# Patient Record
Sex: MALE | Race: WHITE | URBAN - METROPOLITAN AREA
[De-identification: names, ages, dates, MRNs, and addresses within clinical notes are randomized per-mention and may not be internally consistent; named-entity substitution may affect disease eponyms.]

---

## 2020-06-23 ENCOUNTER — HISTORICAL (OUTPATIENT)
Dept: ADMINISTRATIVE | Facility: HOSPITAL | Age: 60
End: 2020-06-23

## 2020-06-23 LAB
INR PPP: 1 (ref 0–1.3)
PROTHROMBIN TIME: 12.7 SECOND(S) (ref 11.1–13.7)

## 2023-12-28 DIAGNOSIS — Z12.11 COLON CANCER SCREENING: Primary | ICD-10-CM

## 2024-06-10 ENCOUNTER — HOSPITAL ENCOUNTER (EMERGENCY)
Facility: HOSPITAL | Age: 64
Discharge: HOME | End: 2024-06-10
Attending: EMERGENCY MEDICINE
Payer: MEDICARE

## 2024-06-10 VITALS
HEIGHT: 71 IN | TEMPERATURE: 97 F | SYSTOLIC BLOOD PRESSURE: 134 MMHG | BODY MASS INDEX: 29.54 KG/M2 | DIASTOLIC BLOOD PRESSURE: 80 MMHG | HEART RATE: 77 BPM | RESPIRATION RATE: 10 BRPM | OXYGEN SATURATION: 95 % | WEIGHT: 211 LBS

## 2024-06-10 DIAGNOSIS — S32.010A COMPRESSION FRACTURE OF L1 VERTEBRA, INITIAL ENCOUNTER: Primary | ICD-10-CM

## 2024-06-10 LAB
ALBUMIN SERPL-MCNC: 3.6 G/DL (ref 3.4–4.8)
ALBUMIN/GLOB SERPL: 0.8 RATIO (ref 1.1–2)
ALP SERPL-CCNC: 88 UNIT/L (ref 40–150)
ALT SERPL-CCNC: 28 UNIT/L (ref 0–55)
ANION GAP SERPL CALC-SCNC: 9 MEQ/L
AST SERPL-CCNC: 32 UNIT/L (ref 5–34)
BASOPHILS # BLD AUTO: 0.05 X10(3)/MCL
BASOPHILS NFR BLD AUTO: 0.9 %
BILIRUB SERPL-MCNC: 0.2 MG/DL
BUN SERPL-MCNC: 13.1 MG/DL (ref 8.4–25.7)
CALCIUM SERPL-MCNC: 9.4 MG/DL (ref 8.8–10)
CHLORIDE SERPL-SCNC: 102 MMOL/L (ref 98–107)
CO2 SERPL-SCNC: 26 MMOL/L (ref 23–31)
CREAT SERPL-MCNC: 0.87 MG/DL (ref 0.73–1.18)
CREAT/UREA NIT SERPL: 15
EOSINOPHIL # BLD AUTO: 0.22 X10(3)/MCL (ref 0–0.9)
EOSINOPHIL NFR BLD AUTO: 3.8 %
ERYTHROCYTE [DISTWIDTH] IN BLOOD BY AUTOMATED COUNT: 15.1 % (ref 11.5–17)
GFR SERPLBLD CREATININE-BSD FMLA CKD-EPI: >60 ML/MIN/1.73/M2
GLOBULIN SER-MCNC: 4.8 GM/DL (ref 2.4–3.5)
GLUCOSE SERPL-MCNC: 107 MG/DL (ref 82–115)
HCT VFR BLD AUTO: 40.9 % (ref 42–52)
HGB BLD-MCNC: 12.9 G/DL (ref 14–18)
IMM GRANULOCYTES # BLD AUTO: 0.02 X10(3)/MCL (ref 0–0.04)
IMM GRANULOCYTES NFR BLD AUTO: 0.3 %
INR PPP: 0.9
LYMPHOCYTES # BLD AUTO: 1.68 X10(3)/MCL (ref 0.6–4.6)
LYMPHOCYTES NFR BLD AUTO: 29.2 %
MCH RBC QN AUTO: 29.2 PG (ref 27–31)
MCHC RBC AUTO-ENTMCNC: 31.5 G/DL (ref 33–36)
MCV RBC AUTO: 92.5 FL (ref 80–94)
MONOCYTES # BLD AUTO: 0.56 X10(3)/MCL (ref 0.1–1.3)
MONOCYTES NFR BLD AUTO: 9.7 %
NEUTROPHILS # BLD AUTO: 3.23 X10(3)/MCL (ref 2.1–9.2)
NEUTROPHILS NFR BLD AUTO: 56.1 %
NRBC BLD AUTO-RTO: 0 %
PLATELET # BLD AUTO: 278 X10(3)/MCL (ref 130–400)
PMV BLD AUTO: 11.4 FL (ref 7.4–10.4)
POTASSIUM SERPL-SCNC: 5.2 MMOL/L (ref 3.5–5.1)
PROT SERPL-MCNC: 8.4 GM/DL (ref 5.8–7.6)
PROTHROMBIN TIME: 12.4 SECONDS (ref 12.5–14.5)
RBC # BLD AUTO: 4.42 X10(6)/MCL (ref 4.7–6.1)
SODIUM SERPL-SCNC: 137 MMOL/L (ref 136–145)
WBC # SPEC AUTO: 5.76 X10(3)/MCL (ref 4.5–11.5)

## 2024-06-10 PROCEDURE — A9577 INJ MULTIHANCE: HCPCS | Performed by: EMERGENCY MEDICINE

## 2024-06-10 PROCEDURE — 99285 EMERGENCY DEPT VISIT HI MDM: CPT | Mod: 25

## 2024-06-10 PROCEDURE — 80053 COMPREHEN METABOLIC PANEL: CPT | Performed by: EMERGENCY MEDICINE

## 2024-06-10 PROCEDURE — 96375 TX/PRO/DX INJ NEW DRUG ADDON: CPT | Mod: 59

## 2024-06-10 PROCEDURE — 85025 COMPLETE CBC W/AUTO DIFF WBC: CPT | Performed by: EMERGENCY MEDICINE

## 2024-06-10 PROCEDURE — 96374 THER/PROPH/DIAG INJ IV PUSH: CPT

## 2024-06-10 PROCEDURE — 25000003 PHARM REV CODE 250: Performed by: NURSE PRACTITIONER

## 2024-06-10 PROCEDURE — 25500020 PHARM REV CODE 255: Performed by: EMERGENCY MEDICINE

## 2024-06-10 PROCEDURE — 63600175 PHARM REV CODE 636 W HCPCS: Performed by: EMERGENCY MEDICINE

## 2024-06-10 PROCEDURE — 85610 PROTHROMBIN TIME: CPT | Performed by: EMERGENCY MEDICINE

## 2024-06-10 RX ORDER — MORPHINE SULFATE 4 MG/ML
4 INJECTION, SOLUTION INTRAMUSCULAR; INTRAVENOUS
Status: DISCONTINUED | OUTPATIENT
Start: 2024-06-10 | End: 2024-06-10

## 2024-06-10 RX ORDER — METHYLPREDNISOLONE 4 MG/1
TABLET ORAL
Qty: 1 EACH | Refills: 0 | Status: SHIPPED | OUTPATIENT
Start: 2024-06-10

## 2024-06-10 RX ORDER — KETOROLAC TROMETHAMINE 30 MG/ML
15 INJECTION, SOLUTION INTRAMUSCULAR; INTRAVENOUS
Status: COMPLETED | OUTPATIENT
Start: 2024-06-10 | End: 2024-06-10

## 2024-06-10 RX ORDER — OXYCODONE AND ACETAMINOPHEN 10; 325 MG/1; MG/1
1 TABLET ORAL EVERY 4 HOURS PRN
Qty: 20 TABLET | Refills: 0 | Status: SHIPPED | OUTPATIENT
Start: 2024-06-10

## 2024-06-10 RX ORDER — ONDANSETRON HYDROCHLORIDE 2 MG/ML
4 INJECTION, SOLUTION INTRAVENOUS
Status: DISCONTINUED | OUTPATIENT
Start: 2024-06-10 | End: 2024-06-10

## 2024-06-10 RX ORDER — MORPHINE SULFATE 4 MG/ML
4 INJECTION, SOLUTION INTRAMUSCULAR; INTRAVENOUS
Status: COMPLETED | OUTPATIENT
Start: 2024-06-10 | End: 2024-06-10

## 2024-06-10 RX ORDER — ONDANSETRON HYDROCHLORIDE 2 MG/ML
4 INJECTION, SOLUTION INTRAVENOUS
Status: COMPLETED | OUTPATIENT
Start: 2024-06-10 | End: 2024-06-10

## 2024-06-10 RX ORDER — OXYCODONE AND ACETAMINOPHEN 5; 325 MG/1; MG/1
1 TABLET ORAL
Status: COMPLETED | OUTPATIENT
Start: 2024-06-10 | End: 2024-06-10

## 2024-06-10 RX ADMIN — GADOBENATE DIMEGLUMINE 20 ML: 529 INJECTION, SOLUTION INTRAVENOUS at 06:06

## 2024-06-10 RX ADMIN — OXYCODONE HYDROCHLORIDE AND ACETAMINOPHEN 1 TABLET: 5; 325 TABLET ORAL at 01:06

## 2024-06-10 RX ADMIN — ONDANSETRON 4 MG: 2 INJECTION INTRAMUSCULAR; INTRAVENOUS at 03:06

## 2024-06-10 RX ADMIN — MORPHINE SULFATE 4 MG: 4 INJECTION INTRAVENOUS at 03:06

## 2024-06-10 RX ADMIN — KETOROLAC TROMETHAMINE 15 MG: 30 INJECTION, SOLUTION INTRAMUSCULAR; INTRAVENOUS at 04:06

## 2024-06-10 NOTE — ED PROVIDER NOTES
Encounter Date: 6/10/2024    SCRIBE #1 NOTE: I, Freddy Holland, am scribing for, and in the presence of,  Randolph Corrales III, MD. I have scribed the following portions of the note - Other sections scribed: HPI, ROS, PE.       History     Chief Complaint   Patient presents with    Back Pain     C/o intermittent lower back pain x 2 weeks. Hx multiple back sx. States recently had outpatient CT which showed L1 fx. States sent by Dr. Smith for MRI.      63 y/o male with no pertinent medical hx presents to the ED for back pain for the past few weeks. Pt states he had back surgery on 3/15 with Dr. Smith and Dr. Henderson. He states he was in the hospital until May and he went to [facility] for rehab. Pt notes he has been ambulating via a walker and wheelchair since discharge. He states his back pain has been worsening for the last few weeks and he is having numbness and weakness in his bilateral legs. He complains of associated symptoms of difficulty urinating but he denies any bowel or urinary incontinence, headache, neck pain, fever, nausea, vomiting, or diarrhea. Pt states he is taking hydrocodone, oxycodone, lasix, and narco.     The history is provided by the patient and medical records. No  was used.     Review of patient's allergies indicates:  No Known Allergies  No past medical history on file.  No past surgical history on file.  No family history on file.     Review of Systems   Constitutional:  Negative for fatigue, fever and unexpected weight change.   HENT:  Negative for congestion and rhinorrhea.    Eyes:  Negative for pain.   Respiratory:  Negative for chest tightness, shortness of breath and wheezing.    Cardiovascular:  Negative for chest pain.   Gastrointestinal:  Negative for abdominal pain, constipation, diarrhea, nausea and vomiting.        No bowel incontinence   Genitourinary:  Positive for difficulty urinating.        No urinary incontinence   Musculoskeletal:  Positive  for back pain. Negative for neck pain.   Skin:  Negative for rash.   Allergic/Immunologic: Negative for environmental allergies, food allergies and immunocompromised state.   Neurological:  Positive for numbness (bilateral legs). Negative for dizziness, speech difficulty and headaches.   Hematological:  Does not bruise/bleed easily.   Psychiatric/Behavioral:  Negative for sleep disturbance and suicidal ideas.        Physical Exam     Initial Vitals [06/10/24 1258]   BP Pulse Resp Temp SpO2   (!) 145/86 100 18 97.4 °F (36.3 °C) 99 %      MAP       --         Physical Exam    Nursing note and vitals reviewed.  Constitutional: No distress.   HENT:   Head: Normocephalic and atraumatic.   Neck: Trachea normal.   Cardiovascular:  Normal rate and regular rhythm.           No murmur heard.  Pulmonary/Chest: Breath sounds normal. No respiratory distress.   Abdominal: Abdomen is soft. Bowel sounds are normal. He exhibits no distension. There is no abdominal tenderness.   Musculoskeletal:         General: Normal range of motion.      Lumbar back: Normal.     Neurological: He is alert and oriented to person, place, and time.   4/5 strength in the bilateral lower extremities   Skin: Skin is warm and dry. No rash noted.   Psychiatric: He has a normal mood and affect. Judgment normal.         ED Course   Critical Care    Date/Time: 6/10/2024 7:46 PM    Performed by: Randolph Corrales III, MD  Authorized by: Randolph Corrales III, MD  Direct patient critical care time: 21 minutes  Ordering / reviewing critical care time: 5 minutes  Documentation critical care time: 5 minutes  Consulting other physicians critical care time: 10 minutes  Total critical care time (exclusive of procedural time) : 41 minutes  Critical care was necessary to treat or prevent imminent or life-threatening deterioration of the following conditions: trauma.  Critical care was time spent personally by me on the following activities: evaluation of patient's  response to treatment, examination of patient, ordering and performing treatments and interventions, ordering and review of laboratory studies, ordering and review of radiographic studies, pulse oximetry and re-evaluation of patient's condition.        Labs Reviewed   COMPREHENSIVE METABOLIC PANEL - Abnormal; Notable for the following components:       Result Value    Potassium 5.2 (*)     Protein Total 8.4 (*)     Globulin 4.8 (*)     Albumin/Globulin Ratio 0.8 (*)     All other components within normal limits   PROTIME-INR - Abnormal; Notable for the following components:    PT 12.4 (*)     All other components within normal limits   CBC WITH DIFFERENTIAL - Abnormal; Notable for the following components:    RBC 4.42 (*)     Hgb 12.9 (*)     Hct 40.9 (*)     MCHC 31.5 (*)     MPV 11.4 (*)     All other components within normal limits   CBC W/ AUTO DIFFERENTIAL    Narrative:     The following orders were created for panel order CBC auto differential.  Procedure                               Abnormality         Status                     ---------                               -----------         ------                     CBC with Differential[[id number]]       Abnormal            Final result                 Please view results for these tests on the individual orders.          Imaging Results              MRI Lumbar Spine W WO Cont (Final result)  Result time 06/10/24 18:20:05      Final result by Belinda Acevedo MD (06/10/24 18:20:05)                   Impression:      Some edema seen along the superior endplate of the L1 vertebral body consistent with the irregularity seen on the CT scan concerning for acute to subacute fracture.    Extensive postsurgical changes seen in the lumbar spine.    No cord contusion or epidural hematoma is seen.    Patient has surgical hardware and artifact from the surgical hardware precludes adequate evaluation of the neural foramen      Electronically signed by: Mayur  Kristina  Date:    06/10/2024  Time:    18:20               Narrative:    EXAMINATION:  MRI LUMBAR SPINE W WO CONTRAST    CLINICAL HISTORY:  Low back pain, prior surgery, new symptoms;Low back pain, symptoms persist with > 6wks conservative treatment;Lumbar radiculopathy, no red flags, no prior management;    TECHNIQUE:  The patient's lumbar spine was examined in the axial and sagittal planes in T1, T2, stir sequences.  Postcontrast imaging was performed as well.    COMPARISON:  CT scan performed earlier today    FINDINGS:  There is some edema seen along the superior posterior endplate of the L1 vertebral body consistent with the fracture that was seen on CT scan.  The edema suggests this is an acute to subacute finding.  There is some mild kyphotic angulation of the spine at T12-L1.  No convincing evidence of epidural hematoma is seen.    The patient's surgical hardware precludes adequate evaluation of the neural foramen.  The patient is status post spinal fusion from L1 through S1.  Laminectomy defects are seen at the level of L1 through L3.    Multilevel areas of facet arthropathy is seen.    No enhancing lesion is seen.                                        CT Lumbar Spine Without Contrast (Final result)  Result time 06/10/24 15:06:18      Final result by Belinda Acevedo MD (06/10/24 15:06:18)                   Impression:      Acute appearing fracture of L1 as outlined above.  I do not have prior images for direct comparison.  Correlation with prior imaging is recommended.  There is some kyphotic angulation of the spine at the level of T12-L1 which was not described on the prior examination and may be related to the acute injury.  Some mild posterior extension is seen of the superior fracture fragment at L1.    Multilevel postsurgical changes seen as outlined above    This report was flagged in Epic as abnormal.      Electronically signed by: Mayur Acevedo  Date:    06/10/2024  Time:    15:06                Narrative:    EXAMINATION:  CT LUMBAR SPINE WITHOUT CONTRAST    CLINICAL HISTORY:  Low back pain, increased fracture risk;    TECHNIQUE:  Low-dose axial images with sagittal and coronal reformations are obtained through the lumbar spine.  Contrast was not administered.  Automatic exposure control (AEC) is utilized to reduce patient radiation exposure.    COMPARISON:  Do not have prior imaging for direct comparison.  There is an MRI report from 04/09/2024 and CT scan report from 03/17/2024 the lumbar spine.    FINDINGS:  The patient has kyphotic angulation of the spine at the level of T12-L1.  There is an acute appearing fracture involving the posterior vertebral body at the level of L1.  The fracture has a transverse orientation and extends posteriorly to involve the pars on the right side.  The superior fracture fragment is slightly retropulsed.  The patient is status post spinal fusion and laminectomy from the level of L1 through S1.    Multilevel facet arthropathy seen in the some foraminal stenosis seen at multiple levels.    Paraspinal soft tissues appear grossly unremarkable.                                       Medications   oxyCODONE-acetaminophen 5-325 mg per tablet 1 tablet (1 tablet Oral Given 6/10/24 1350)   morphine injection 4 mg (4 mg Intravenous Given 6/10/24 1545)   ondansetron injection 4 mg (4 mg Intravenous Given 6/10/24 1545)   ketorolac injection 15 mg (15 mg Intravenous Given 6/10/24 1625)   gadobenate dimeglumine (MULTIHANCE) injection 20 mL (20 mLs Intravenous Given 6/10/24 1811)     Medical Decision Making  Differential diagnosis includes but is not limited to: post-surgery injury, lumbar radiculopathy, caudis equina syndrome, pathologic fracture, post-op infection     Patient normal neuro exam 1+ dorsalis pedis pulse 1+ patellar pulses.  Patient states having trouble with bowel or bladder incontinence patient's CT with acute fracture that has been going on for a week MRI does not  reveal any significant abnormalities patient states he was able to ambulate trouble because of pain.  Neurosurgery states stable for discharge will follow up patient will return if any problems does have a brace at home able to void without any difficulty no saddle paresthesia      Amount and/or Complexity of Data Reviewed  Labs: ordered.  Radiology: ordered.    Risk  Prescription drug management.            Scribe Attestation:   Scribe #1: I performed the above scribed service and the documentation accurately describes the services I performed. I attest to the accuracy of the note.    Attending Attestation:           Physician Attestation for Scribe:  Physician Attestation Statement for Scribe #1: I, Randolph Corrales III, MD, reviewed documentation, as scribed by Freddy Holland in my presence, and it is both accurate and complete.             ED Course as of 06/10/24 1946   Mon Jarret 10, 2024   1540 Paged neurosurgery: Dr Alejandra Smith MD []   1837 Paged neurosurgery: Dr Alejandra Smith MD []      ED Course User Index  [] Freddy Holland                             Clinical Impression:  Final diagnoses:  [S32.010A] Compression fracture of L1 vertebra, initial encounter (Primary)          ED Disposition Condition    Discharge Stable          ED Prescriptions       Medication Sig Dispense Start Date End Date Auth. Provider    methylPREDNISolone (MEDROL DOSEPACK) 4 mg tablet Take per package directions 1 each 6/10/2024 -- Randolph Corrales III, MD    oxyCODONE-acetaminophen (PERCOCET)  mg per tablet Take 1 tablet by mouth every 4 (four) hours as needed for Pain. 20 tablet 6/10/2024 -- Randolph Corrales III, MD          Follow-up Information       Follow up With Specialties Details Why Contact Info    Will Hartman III, APRN-CNP Family Medicine In 1 week  [address] [facility] [address]  [phone]      Alejandra Smith MD Neurosurgery In 3 days  [address]  [facility] [address] [phone]                Randolph Corrales III, MD  06/10/24 1946       Randolph Corrales III, MD  06/10/24 1946

## 2024-06-10 NOTE — FIRST PROVIDER EVALUATION
"Medical screening examination initiated.  I have conducted a focused provider triage encounter, findings are as follows:    Brief history of present illness:  63 y/o male presents with worsening low back pain down his legs with some paresthesia for 2 weeks no loss of bowel/bladder control. Had last back surgery with kulwant smith 3/2024. No new injury/trauma. States recent CT abd/pelvis showed L1 fracture. Told by dr. Smith office to come to ER     Vitals:    06/10/24 1258   BP: (!) 145/86   Pulse: 100   Resp: 18   Temp: 97.4 °F (36.3 °C)   SpO2: 99%   Weight: 95.7 kg (211 lb)   Height: 5' 11" (1.803 m)       Pertinent physical exam:  alert, in wheel chair, nonlabored     Brief workup plan:  meds, imaging    Preliminary workup initiated; this workup will be continued and followed by the physician or advanced practice provider that is assigned to the patient when roomed.  "

## 2024-06-10 NOTE — CONSULTS
[facility] - Emergency Dept  Neurosurgery  Consult Note    Consults  Subjective:     Chief Complaint/Reason for Admission:     C/o intermittent lower back pain x 2 weeks. Hx multiple back sx. States recently had outpatient CT which showed L1 fx. States sent by Dr. Smith for MRI.        History of Present Illness:  This is a 64-year-old male with no major past medical history other than back surgery who presented to the ED for back pain for the past few weeks.  Had back surgery on 03/15/2024 with Dr. Smith and Dr. Henderson.  Was in the hospital until May in went to a rehab after.  He had been ambulating with a walker and wheelchair since discharge.  States his back pain is worsening for the last few weeks and is now having numbness and weakness in bilateral legs.  Complains of difficulty urinating but denies any bowel or urinary incontinence.    CT lumbar spine without contrast was performed today: Acute appearing fracture of L1. There is some kyphotic angulation of the spine at the level of T12-L1 which was not described on the prior examination and may be related to the acute injury.  Some mild posterior extension is seen of the superior fracture fragment at L1.     On physical exam the patient is sitting up in bed.  States that he is here with back pain.  States he did have a fall about 3 weeks ago at home.  States he also might have over done it in recovery phase.  States he has a baseline of decreased sensation to bilateral lower extremities and can not typically plantar or dorsiflex very well.  Again this is historical.  He also states he is urinary retention reoccurs each time he increases his opioid intake.  He states this is what is happening at this time.  His main complaint is increasing back pain.  He has some numbness in the hips bilaterally.    (Not in a hospital admission)      Review of patient's allergies indicates:  No Known Allergies    No past medical history on file.  No past  surgical history on file.  Family History    None       Tobacco Use    Smoking status: Not on file    Smokeless tobacco: Not on file   Substance and Sexual Activity    Alcohol use: Not on file    Drug use: Not on file    Sexual activity: Not on file     Review of Systems   Genitourinary:  Positive for difficulty urinating.   Musculoskeletal:  Positive for back pain.   Neurological:  Positive for weakness and numbness.     Objective:     Weight: 95.7 kg (211 lb)  Body mass index is 29.43 kg/m².  Vital Signs (Most Recent):  Temp: 97.4 °F (36.3 °C) (06/10/24 1258)  Pulse: 87 (06/10/24 1601)  Resp: 16 (06/10/24 1601)  BP: (!) 130/96 (06/10/24 1601)  SpO2: 97 % (06/10/24 1601) Vital Signs (24h Range):  Temp:  [97.4 °F (36.3 °C)] 97.4 °F (36.3 °C)  Pulse:  [] 87  Resp:  [13-19] 16  SpO2:  [96 %-100 %] 97 %  BP: (122-145)/(86-96) 130/96                              Physical Exam:  Nursing note and vitals reviewed.    Constitutional: He appears well-developed and well-nourished. He is not diaphoretic. No distress.     Eyes: Pupils are equal, round, and reactive to light. Conjunctivae and EOM are normal.     Cardiovascular: Normal rate.     Abdominal: Soft. Bowel sounds are normal.     Skin: Skin displays no rash on trunk. Skin displays no lesions on trunk.     Psych/Behavior: He is alert. He is oriented to person, place, and time. He has a normal mood and affect.     Musculoskeletal:        Back: There is tenderness.        Right Upper Extremities: Muscle strength is 5/5.        Left Upper Extremities: Muscle strength is 5/5.       Right Lower Extremities: Muscle strength is 5/5.        Left Lower Extremities: Muscle strength is 5/5.      Comments: Right hand  5/5, deltoids 5/5, triceps 5/5, biceps 5/5, wrist extension 5/5.   Left hand  5/5, deltoids 5/5, triceps 5/5, biceps 5/5, wrist extension 5/5.                                          Right       Left  Hip flexion (L2) 4/5 4/5   Knee extension  (L3) 4/5 4/5  Knee flexion (L4) 4/5 4/5  Dorsiflexion (L5) 1/5 1/5  EHL (L5)             1/5 1/5  Plantar flexion (S1) 1/5 1/5    Sensation:  Historically patient does not have sensation to most of his bilateral lower extremities except for bilateral ankles.    Neg clonus, menchaca's and babinski bilaterally.    Numbness to lower extremities.    Back pain lumbar region  Difficulty urinating   No bowel or bladder incontinence           Neurological:        Sensory: There is no sensory deficit in the trunk. There is no sensory deficit in the extremities.        DTRs: DTRs are DTRS NORMAL AND SYMMETRICnormal and symmetric. He displays no Babinski's sign on the right side. He displays no Babinski's sign on the left side.        Cranial nerves: Cranial nerve(s) II, III, IV, V, VI, VII, VIII, IX, X, XI and XII are intact.   GCS 15   Awake and oriented   PERRLA   Follows commands       Significant Labs:  Recent Labs   Lab 06/10/24  1546      K 5.2*      CO2 26   BUN 13.1   CREATININE 0.87   CALCIUM 9.4     Recent Labs   Lab 06/10/24  1546   WBC 5.76   HGB 12.9*   HCT 40.9*        Recent Labs   Lab 06/10/24  1544   INR 0.9     Microbiology Results (last 7 days)       ** No results found for the last 168 hours. **            Assessment/Plan:    Recent back surgery on 03/15/2024   Acute appearing fracture of L1.    Back pain, difficulty urinating, numbness to bilateral lower extremities.      Pain control   SCDs   Fall precautions  Neuromotor exams every 4 hours   MRI lumbar spine without contrast has been ordered.      Await imaging for further recommendations        There are no hospital problems to display for this patient.      Thank you for your consult. I will follow-up with patient. Please contact us if you have any additional questions.    [place], [facility]  Neurosurgery  [facility] - Emergency Dept     Bleeding that does not stop/Fever greater than (need to indicate Fahrenheit or Celsius)/Nausea and vomiting that does not stop

## 2024-08-09 ENCOUNTER — HOSPITAL ENCOUNTER (OUTPATIENT)
Dept: RADIOLOGY | Facility: HOSPITAL | Age: 64
Discharge: HOME | End: 2024-08-09
Attending: NEUROLOGICAL SURGERY
Payer: MEDICARE

## 2024-08-09 DIAGNOSIS — D78.01 INTRAOPERATIVE HEMORRHAGE AND HEMATOMA OF SPLEEN COMPLICATING PROCEDURE ON SPLEEN: ICD-10-CM

## 2024-08-09 DIAGNOSIS — Z01.818 PREOP EXAMINATION: ICD-10-CM

## 2024-08-09 PROCEDURE — 71046 X-RAY EXAM CHEST 2 VIEWS: CPT | Mod: TC

## 2024-08-12 ENCOUNTER — HOSPITAL ENCOUNTER (EMERGENCY)
Facility: HOSPITAL | Age: 64
Discharge: HOME | End: 2024-08-12
Attending: STUDENT IN AN ORGANIZED HEALTH CARE EDUCATION/TRAINING PROGRAM
Payer: MEDICARE

## 2024-08-12 ENCOUNTER — OFFICE VISIT (OUTPATIENT)
Dept: URGENT CARE | Facility: CLINIC | Age: 64
End: 2024-08-12
Payer: MEDICARE

## 2024-08-12 VITALS
HEART RATE: 86 BPM | BODY MASS INDEX: 29.4 KG/M2 | OXYGEN SATURATION: 98 % | DIASTOLIC BLOOD PRESSURE: 87 MMHG | TEMPERATURE: 99 F | HEART RATE: 87 BPM | RESPIRATION RATE: 18 BRPM | BODY MASS INDEX: 29.4 KG/M2 | DIASTOLIC BLOOD PRESSURE: 87 MMHG | SYSTOLIC BLOOD PRESSURE: 136 MMHG | HEIGHT: 71 IN | WEIGHT: 210 LBS | SYSTOLIC BLOOD PRESSURE: 128 MMHG | OXYGEN SATURATION: 99 % | RESPIRATION RATE: 18 BRPM | WEIGHT: 210 LBS | HEIGHT: 71 IN | TEMPERATURE: 99 F

## 2024-08-12 DIAGNOSIS — R21 SKIN RASH: Primary | ICD-10-CM

## 2024-08-12 DIAGNOSIS — L01.00 IMPETIGO: Primary | ICD-10-CM

## 2024-08-12 PROCEDURE — 99202 OFFICE O/P NEW SF 15 MIN: CPT | Mod: ,,, | Performed by: FAMILY MEDICINE

## 2024-08-12 PROCEDURE — 99284 EMERGENCY DEPT VISIT MOD MDM: CPT

## 2024-08-12 RX ORDER — NALOXONE HYDROCHLORIDE 4 MG/.1ML
1 SPRAY NASAL ONCE
COMMUNITY

## 2024-08-12 RX ORDER — ENOXAPARIN SODIUM 100 MG/ML
90 INJECTION SUBCUTANEOUS 2 TIMES DAILY
COMMUNITY

## 2024-08-12 RX ORDER — TAMSULOSIN HYDROCHLORIDE 0.4 MG/1
1 CAPSULE ORAL
COMMUNITY

## 2024-08-12 RX ORDER — FUROSEMIDE 40 MG/1
40 TABLET ORAL EVERY MORNING
COMMUNITY

## 2024-08-12 RX ORDER — WARFARIN SODIUM 5 MG/1
5 TABLET ORAL
COMMUNITY

## 2024-08-12 RX ORDER — MUPIROCIN 20 MG/G
OINTMENT TOPICAL 3 TIMES DAILY
Qty: 44 G | Refills: 0 | Status: SHIPPED | OUTPATIENT
Start: 2024-08-12

## 2024-08-12 RX ORDER — FINASTERIDE 5 MG/1
5 TABLET, FILM COATED ORAL DAILY
COMMUNITY

## 2024-08-12 RX ORDER — SULFAMETHOXAZOLE AND TRIMETHOPRIM 800; 160 MG/1; MG/1
1 TABLET ORAL 2 TIMES DAILY
Qty: 14 TABLET | Refills: 0 | Status: SHIPPED | OUTPATIENT
Start: 2024-08-12 | End: 2024-08-19

## 2024-08-12 RX ORDER — WARFARIN SODIUM 5 MG/1
0.5 TABLET ORAL
COMMUNITY

## 2024-08-12 RX ORDER — GABAPENTIN 400 MG/1
400 CAPSULE ORAL 3 TIMES DAILY
COMMUNITY

## 2024-08-12 NOTE — PROGRESS NOTES
"Subjective:      Patient ID: Bandar Foley is a 64 y.o. male.    Vitals:  height is 5' 11" (1.803 m) and weight is 95.3 kg (210 lb). His temperature is 98.6 °F (37 °C). His blood pressure is 136/87 and his pulse is 86. His respiration is 18 and oxygen saturation is 99%.     Chief Complaint: Rash     Patient is a 64 y.o. male who presents to urgent care with complaints of rash x2 days. Alleviating factors include antibiotic ointment with no relief. Patient denies fever.     ROS :  Constitutional : No Fever  Neck : Negative except HPI  Respiratory : Negative for shortness of breath and wheezing  Cardiovascular : Negative for chest pain, No palpitations  Gastrointestinal : No nausea, No vomiting, No abdominal pain, No diarrhea  Muskeloskeletal : Negative except HPI  Integumentary : As Per HPI  Neurological :  Chronic low back pain     [place] [employer] [place] [employer] [place]:  64-year-old male known for multiple chronic medical condition including multiple pulmonary emboli, DVT and neuropathy.  Follows up with primary MD.  Presents with concerns of rash on his face, acute onset and worsening.  No change to personal products, no new medications.  States rash has been weepy and facial swelling mainly on right side.  States does not go outside.  No concerns of positive exposure to poison ivy or poison oak.  Objective:     Physical Exam  General : Alert and oriented, No apparent distress, Afebrile, sitting on exam table, clear speech  Neck -: supple, Non Tender  HENT :  Bilateral ear canal no lesions, TM intact no redness  Respiratory :Lungs are clear to auscultate, Breath sounds are equal  Cardiovascular : Normal rate, normal volume pulse bilateral  Integumentary : Warm, Dry and face bilateral medially appears erythematous, right side appears swollen, bilateral ear excoriation, weepy lesions.  Bilateral neck laterally  erythematous lesion, serosanguineous drainage dripping from his earlobes.  Assessment:     1. Skin rash      Plan:   Considering acute " onset and worsening weeping skin rash to face and bilateral ear and neck  Encouraged to go to ER directly from the clinic for further evaluation.  Desires to go in personal vehicle.  Follow up with primary MD    Skin rash

## 2024-08-12 NOTE — ED TRIAGE NOTES
Pt sent to ED from Urgent care with complaint of facial itching and scaling patches that have worsened and have began to weep to bith ears and face which was initially treated with OTC cortisone cream and antibacterial soap. Pt denies fever or being outside

## 2024-08-13 ENCOUNTER — ANESTHESIA EVENT (OUTPATIENT)
Dept: SURGERY | Facility: HOSPITAL | Age: 64
End: 2024-08-13
Payer: MEDICARE

## 2024-08-13 NOTE — ED PROVIDER NOTES
Encounter Date: 8/12/2024       History     Chief Complaint   Patient presents with    Facial Swelling     Pt sent to ED from Urgent care with complaint of facial itching and scaling patches that have worsened and have began to weep to bith ears and face which was initially treated with OTC cortisone cream and antibacterial soap. Pt denies fever or being outside     See MDM    The history is provided by the patient. No  was used.     Review of patient's allergies indicates:  No Known Allergies  Past Medical History:   Diagnosis Date    Anticoagulant long-term use     DVT (deep venous thrombosis)     following back surgery    Encounter for blood transfusion     Foot drop, bilateral     Hypercholesterolemia     Lumbar vertebral fracture     Multiple pulmonary emboli     12/18/2023    Neuropathy      Past Surgical History:   Procedure Laterality Date    BACK SURGERY      2 prior  to 3/15/24 surgery    EVACUATION OF HEMATOMA  03/17/2024    following  back surgery    LUIS E FILTER PLACEMENT  03/11/2024    LUMBAR LAMINECTOMY FOR DECOMPRESSION  03/15/2024    L2-L3 with fusion     No family history on file.  Social History     Tobacco Use    Smoking status: Former     Types: Cigarettes    Smokeless tobacco: Former   Substance Use Topics    Alcohol use: Not Currently    Drug use: Yes     Types: Oxycodone     Comment: back pain     Review of Systems   Skin:  Positive for rash.   All other systems reviewed and are negative.      Physical Exam     Initial Vitals [08/12/24 1855]   BP Pulse Resp Temp SpO2   128/87 87 18 98.6 °F (37 °C) 98 %      MAP       --         Physical Exam    Nursing note and vitals reviewed.  Constitutional: He appears well-developed and well-nourished.   Cardiovascular:  Normal rate.           Pulmonary/Chest: No respiratory distress.     Neurological: He is alert and oriented to person, place, and time.   Skin: Skin is warm and dry. Rash noted. Rash is papular and pustular.               ED Course   Procedures  Labs Reviewed - No data to display       Imaging Results    None          Medications - No data to display  Medical Decision Making  64-year-old male presents with having a rash that initially started behind the left ear and on the left ear which is now progressively spreading to his face over the last couple of days.  He states the initial rash on the left earlobe and behind his left ear has been there for about 2 weeks.  It is leaking a yellowish she sticky substance.  He has not had any fever.  He is concerned because it is continuing to spread.  He has been applying hydrocortisone cream but it is not helping.  He went to urgent Care prior to coming here and he was told to come here due to them not being able to tell him what was going on.    Upon physical exam his rash with a honeycomb appearance red base appears to be impetigo in nature.  We will cover with Bactroban ointment and also will cover with Bactrim oral due to the severity of the spreading and how bad it is currently.  He is not toxic in appearance.      Additional MDM:   Differential Diagnosis:   Other: The following diagnoses were also considered and will be evaluated: cellulitis, impetigo and abscess.                                   Clinical Impression:  Final diagnoses:  [L01.00] Impetigo (Primary)          ED Disposition Condition    Discharge Stable          ED Prescriptions       Medication Sig Dispense Start Date End Date Auth. Provider    sulfamethoxazole-trimethoprim 800-160mg (BACTRIM DS) 800-160 mg Tab Take 1 tablet by mouth 2 (two) times daily. for 7 days 14 tablet 8/12/2024 8/19/2024 Colleen Kuhn FNP    mupirocin (BACTROBAN) 2 % ointment Apply topically 3 (three) times daily. 44 g 8/12/2024 -- Colleen Kuhn FNP          Follow-up Information       Follow up With Specialties Details Why Contact Info    Will Hartman III, APRN-CNP Family Medicine Call in 1 week  [address]  [place] [address]  [phone]               Colleen Kuhn, [facility]  08/12/24 1278

## 2024-08-13 NOTE — DISCHARGE INSTRUCTIONS
Clean with mild soap and water. Apply bactroban ointment to area with gloves or use q tip. Bactrim ds for infection.

## 2024-08-20 ENCOUNTER — PATIENT MESSAGE (OUTPATIENT)
Dept: SURGERY | Facility: HOSPITAL | Age: 64
End: 2024-08-20
Payer: MEDICARE

## 2024-08-21 ENCOUNTER — PATIENT MESSAGE (OUTPATIENT)
Dept: ADMINISTRATIVE | Facility: OTHER | Age: 64
End: 2024-08-21
Payer: MEDICARE

## 2024-08-21 NOTE — CLINICAL REVIEW
coumadin? Xarelto? Eliquis? labs/EKG/CXR reviewed. cardiology notes ood. Echo noted. Cardiac Clearance noted. ccv// last dose of coumadin 8/17. crc sd

## 2024-08-22 ENCOUNTER — PATIENT MESSAGE (OUTPATIENT)
Dept: ADMINISTRATIVE | Facility: OTHER | Age: 64
End: 2024-08-22
Payer: MEDICARE

## 2024-08-22 NOTE — PRE-PROCEDURE INSTRUCTIONS
"[facility]: Outpatient Surgery  Preprocedure Check-In Instructions     Your arrival time for your surgery or procedure is __0500____.  We ask patients to arrive about 2 hours before surgery to allow for enough time to review your health history & medications, start your IV, complete any outstanding labwork or tests, and meet your Anesthesiologist.    Expectations: "Because of inconsistent procedure completion times, an unexpected wait may occur. The Physicians would like you to be here to prepare in the event they run ahead of time. We will make you as comfortable as possible and keep you informed. We apologize in advance if this happens."    You will arrive at [facility], [address], [place].  Enter through the [address] entrance next to the Emergency Room, and come to the 6th floor to the Outpatient Surgery Department.     Visitory Policy:  You are allowed 2 adult visitors to be with you in the hospital. All hospital visitors should be in good current health.  No small children.     What to Bring:  Please have your ID, insurance cards, and all home medication bottles with you at check in.  Bring your CPAP machine if one is used at home.     Fasting:  Nothing to eat or drink after midnight the night before your procedure. This includes no ice, gum, hard candies, and/or tobacco products.  Follow your doctor's instructions for taking any medications on the morning of your procedure.  If no instructions for taking medications were given, do not take any medications but bring your medications in their bottles to your procedure check in.     Follow your doctor's preoperative instructions regarding skin prep, bowel prep, bathing, or showering prior to your procedure.  If any special soaps were provided to you, please use according to your doctor's instructions. If no instructions were given from your doctor, take a good bath or shower with antibacterial soap the night " before and the morning of your procedure.  On the morning of procedure, wear loose, comfortable clothing.  No lotions, makeup, perfumes, colognes, deodorant, or jewelry to your procedure.  Removable items (glasses, contact lenses, dentures, retainers, hearing aids) need to be removed for your procedure.  Bring your storage containers for these items if you wear them.     Artificial nails, body jewelry, eyelash extensions, and/or hair extensions with metal clips are not allowed during your surgery.  If you currently wear any of these items, please arrange for them to be removed prior to your arrival to the hospital.     Outpatient or Same Day Surgeries:  Any patients receiving sedation/anesthesia are advised not to drive for 24 hours after their procedure.  We do not allow patients to drive themselves home after discharge.  If you are going home after your procedure, please have someone available to drive you home from the hospital.        You may call the Outpatient Surgery Department at [phone] with any questions or concerns.  We are looking forward to meeting you and taking great care of you for your procedure.  Thank you for choosing [facility] for your surgical needs.

## 2024-08-23 ENCOUNTER — HOSPITAL ENCOUNTER (OUTPATIENT)
Facility: HOSPITAL | Age: 64
Discharge: HOME | End: 2024-08-23
Attending: NEUROLOGICAL SURGERY | Admitting: NEUROLOGICAL SURGERY
Payer: MEDICARE

## 2024-08-23 ENCOUNTER — ANESTHESIA (OUTPATIENT)
Dept: SURGERY | Facility: HOSPITAL | Age: 64
End: 2024-08-23
Payer: MEDICARE

## 2024-08-23 DIAGNOSIS — S32.009A CLOSED FRACTURE OF LUMBAR VERTEBRA WITHOUT SPINAL CORD INJURY, INITIAL ENCOUNTER: Primary | ICD-10-CM

## 2024-08-23 DIAGNOSIS — M54.16 LUMBAR RADICULOPATHY: ICD-10-CM

## 2024-08-23 LAB
GROUP & RH: NORMAL
INDIRECT COOMBS: NORMAL
INR PPP: 1
PROTHROMBIN TIME: 13 SECONDS (ref 12.5–14.5)
SPECIMEN OUTDATE: NORMAL

## 2024-08-23 PROCEDURE — C1713 ANCHOR/SCREW BN/BN,TIS/BN: HCPCS | Performed by: NEUROLOGICAL SURGERY

## 2024-08-23 PROCEDURE — 27201423 OPTIME MED/SURG SUP & DEVICES STERILE SUPPLY: Performed by: NEUROLOGICAL SURGERY

## 2024-08-23 PROCEDURE — 86850 RBC ANTIBODY SCREEN: CPT | Performed by: NEUROLOGICAL SURGERY

## 2024-08-23 PROCEDURE — 25000003 PHARM REV CODE 250: Performed by: ANESTHESIOLOGY

## 2024-08-23 PROCEDURE — 71000015 HC POSTOP RECOV 1ST HR: Performed by: NEUROLOGICAL SURGERY

## 2024-08-23 PROCEDURE — 25000003 PHARM REV CODE 250

## 2024-08-23 PROCEDURE — 36000707: Performed by: NEUROLOGICAL SURGERY

## 2024-08-23 PROCEDURE — 85610 PROTHROMBIN TIME: CPT | Performed by: NEUROLOGICAL SURGERY

## 2024-08-23 PROCEDURE — 63600175 PHARM REV CODE 636 W HCPCS

## 2024-08-23 PROCEDURE — 25000003 PHARM REV CODE 250: Performed by: NEUROLOGICAL SURGERY

## 2024-08-23 PROCEDURE — 86900 BLOOD TYPING SEROLOGIC ABO: CPT | Performed by: NEUROLOGICAL SURGERY

## 2024-08-23 PROCEDURE — 71000033 HC RECOVERY, INTIAL HOUR: Performed by: NEUROLOGICAL SURGERY

## 2024-08-23 PROCEDURE — 37000009 HC ANESTHESIA EA ADD 15 MINS: Performed by: NEUROLOGICAL SURGERY

## 2024-08-23 PROCEDURE — 37000008 HC ANESTHESIA 1ST 15 MINUTES: Performed by: NEUROLOGICAL SURGERY

## 2024-08-23 PROCEDURE — 36000706: Performed by: NEUROLOGICAL SURGERY

## 2024-08-23 PROCEDURE — 25500020 PHARM REV CODE 255: Performed by: NEUROLOGICAL SURGERY

## 2024-08-23 PROCEDURE — 71000016 HC POSTOP RECOV ADDL HR: Performed by: NEUROLOGICAL SURGERY

## 2024-08-23 DEVICE — IMPLANTABLE DEVICE: Type: IMPLANTABLE DEVICE | Site: SPINE LUMBAR | Status: FUNCTIONAL

## 2024-08-23 RX ORDER — OXYCODONE AND ACETAMINOPHEN 10; 325 MG/1; MG/1
1 TABLET ORAL EVERY 4 HOURS PRN
Status: DISCONTINUED | OUTPATIENT
Start: 2024-08-23 | End: 2024-08-23 | Stop reason: HOSPADM

## 2024-08-23 RX ORDER — DIPHENHYDRAMINE HYDROCHLORIDE 50 MG/ML
25 INJECTION INTRAMUSCULAR; INTRAVENOUS EVERY 6 HOURS PRN
Status: DISCONTINUED | OUTPATIENT
Start: 2024-08-23 | End: 2024-08-23 | Stop reason: HOSPADM

## 2024-08-23 RX ORDER — FENTANYL CITRATE 50 UG/ML
INJECTION, SOLUTION INTRAMUSCULAR; INTRAVENOUS
Status: DISCONTINUED | OUTPATIENT
Start: 2024-08-23 | End: 2024-08-23

## 2024-08-23 RX ORDER — ONDANSETRON 4 MG/1
4 TABLET, ORALLY DISINTEGRATING ORAL EVERY 6 HOURS PRN
Status: DISCONTINUED | OUTPATIENT
Start: 2024-08-23 | End: 2024-08-23 | Stop reason: HOSPADM

## 2024-08-23 RX ORDER — LIDOCAINE HYDROCHLORIDE 20 MG/ML
INJECTION, SOLUTION EPIDURAL; INFILTRATION; INTRACAUDAL; PERINEURAL
Status: DISCONTINUED | OUTPATIENT
Start: 2024-08-23 | End: 2024-08-23

## 2024-08-23 RX ORDER — MIDAZOLAM HYDROCHLORIDE 2 MG/2ML
2 INJECTION, SOLUTION INTRAMUSCULAR; INTRAVENOUS ONCE AS NEEDED
Status: DISCONTINUED | OUTPATIENT
Start: 2024-08-23 | End: 2024-08-23 | Stop reason: HOSPADM

## 2024-08-23 RX ORDER — CEFAZOLIN SODIUM 2 G/50ML
2 SOLUTION INTRAVENOUS
Status: DISCONTINUED | OUTPATIENT
Start: 2024-08-23 | End: 2024-08-23 | Stop reason: HOSPADM

## 2024-08-23 RX ORDER — PHENYLEPHRINE HYDROCHLORIDE 10 MG/ML
INJECTION INTRAVENOUS
Status: DISCONTINUED | OUTPATIENT
Start: 2024-08-23 | End: 2024-08-23

## 2024-08-23 RX ORDER — DEXAMETHASONE SODIUM PHOSPHATE 4 MG/ML
INJECTION, SOLUTION INTRA-ARTICULAR; INTRALESIONAL; INTRAMUSCULAR; INTRAVENOUS; SOFT TISSUE
Status: DISCONTINUED | OUTPATIENT
Start: 2024-08-23 | End: 2024-08-23

## 2024-08-23 RX ORDER — OXYCODONE AND ACETAMINOPHEN 5; 325 MG/1; MG/1
1 TABLET ORAL EVERY 4 HOURS PRN
Status: DISCONTINUED | OUTPATIENT
Start: 2024-08-23 | End: 2024-08-23 | Stop reason: HOSPADM

## 2024-08-23 RX ORDER — ROCURONIUM BROMIDE 10 MG/ML
INJECTION, SOLUTION INTRAVENOUS
Status: DISCONTINUED | OUTPATIENT
Start: 2024-08-23 | End: 2024-08-23

## 2024-08-23 RX ORDER — LIDOCAINE HYDROCHLORIDE 10 MG/ML
1 INJECTION, SOLUTION EPIDURAL; INFILTRATION; INTRACAUDAL; PERINEURAL ONCE
Status: DISCONTINUED | OUTPATIENT
Start: 2024-08-23 | End: 2024-08-23 | Stop reason: HOSPADM

## 2024-08-23 RX ORDER — METHOCARBAMOL 750 MG/1
750 TABLET, FILM COATED ORAL EVERY 8 HOURS PRN
Status: DISCONTINUED | OUTPATIENT
Start: 2024-08-23 | End: 2024-08-23 | Stop reason: HOSPADM

## 2024-08-23 RX ORDER — CEFAZOLIN SODIUM 1 G/3ML
INJECTION, POWDER, FOR SOLUTION INTRAMUSCULAR; INTRAVENOUS
Status: DISCONTINUED | OUTPATIENT
Start: 2024-08-23 | End: 2024-08-23

## 2024-08-23 RX ORDER — SODIUM CHLORIDE, SODIUM GLUCONATE, SODIUM ACETATE, POTASSIUM CHLORIDE AND MAGNESIUM CHLORIDE 30; 37; 368; 526; 502 MG/100ML; MG/100ML; MG/100ML; MG/100ML; MG/100ML
INJECTION, SOLUTION INTRAVENOUS CONTINUOUS
Status: DISCONTINUED | OUTPATIENT
Start: 2024-08-23 | End: 2024-08-23 | Stop reason: HOSPADM

## 2024-08-23 RX ORDER — SUCCINYLCHOLINE CHLORIDE 20 MG/ML
INJECTION INTRAMUSCULAR; INTRAVENOUS
Status: DISCONTINUED | OUTPATIENT
Start: 2024-08-23 | End: 2024-08-23

## 2024-08-23 RX ORDER — ACETAMINOPHEN 325 MG/1
325 TABLET ORAL EVERY 6 HOURS PRN
Status: DISCONTINUED | OUTPATIENT
Start: 2024-08-23 | End: 2024-08-23 | Stop reason: HOSPADM

## 2024-08-23 RX ORDER — METOCLOPRAMIDE HYDROCHLORIDE 5 MG/ML
10 INJECTION INTRAMUSCULAR; INTRAVENOUS EVERY 10 MIN PRN
Status: DISCONTINUED | OUTPATIENT
Start: 2024-08-23 | End: 2024-08-23 | Stop reason: HOSPADM

## 2024-08-23 RX ORDER — IOPAMIDOL 612 MG/ML
INJECTION, SOLUTION INTRAVASCULAR
Status: DISCONTINUED | OUTPATIENT
Start: 2024-08-23 | End: 2024-08-23 | Stop reason: HOSPADM

## 2024-08-23 RX ORDER — MUPIROCIN 20 MG/G
OINTMENT TOPICAL 2 TIMES DAILY
Status: DISCONTINUED | OUTPATIENT
Start: 2024-08-23 | End: 2024-08-23 | Stop reason: HOSPADM

## 2024-08-23 RX ORDER — ONDANSETRON 4 MG/1
4 TABLET, ORALLY DISINTEGRATING ORAL ONCE
Status: COMPLETED | OUTPATIENT
Start: 2024-08-23 | End: 2024-08-23

## 2024-08-23 RX ORDER — PROPOFOL 10 MG/ML
VIAL (ML) INTRAVENOUS
Status: DISCONTINUED | OUTPATIENT
Start: 2024-08-23 | End: 2024-08-23

## 2024-08-23 RX ORDER — ONDANSETRON HYDROCHLORIDE 2 MG/ML
4 INJECTION, SOLUTION INTRAVENOUS DAILY PRN
Status: DISCONTINUED | OUTPATIENT
Start: 2024-08-23 | End: 2024-08-23 | Stop reason: HOSPADM

## 2024-08-23 RX ORDER — LIDOCAINE HYDROCHLORIDE AND EPINEPHRINE 5; 5 MG/ML; UG/ML
INJECTION, SOLUTION INFILTRATION; PERINEURAL
Status: DISCONTINUED | OUTPATIENT
Start: 2024-08-23 | End: 2024-08-23 | Stop reason: HOSPADM

## 2024-08-23 RX ORDER — HYDROMORPHONE HYDROCHLORIDE 2 MG/ML
0.2 INJECTION, SOLUTION INTRAMUSCULAR; INTRAVENOUS; SUBCUTANEOUS EVERY 5 MIN PRN
Status: DISCONTINUED | OUTPATIENT
Start: 2024-08-23 | End: 2024-08-23 | Stop reason: HOSPADM

## 2024-08-23 RX ADMIN — ONDANSETRON 4 MG: 4 TABLET, ORALLY DISINTEGRATING ORAL at 06:08

## 2024-08-23 RX ADMIN — ROCURONIUM BROMIDE 5 MG: 10 SOLUTION INTRAVENOUS at 07:08

## 2024-08-23 RX ADMIN — Medication 160 MG: at 07:08

## 2024-08-23 RX ADMIN — PHENYLEPHRINE HYDROCHLORIDE 100 MCG: 10 INJECTION INTRAVENOUS at 07:08

## 2024-08-23 RX ADMIN — CEFAZOLIN 2 G: 330 INJECTION, POWDER, FOR SOLUTION INTRAMUSCULAR; INTRAVENOUS at 07:08

## 2024-08-23 RX ADMIN — SODIUM CHLORIDE, SODIUM GLUCONATE, SODIUM ACETATE, POTASSIUM CHLORIDE AND MAGNESIUM CHLORIDE: 526; 502; 368; 37; 30 INJECTION, SOLUTION INTRAVENOUS at 07:08

## 2024-08-23 RX ADMIN — LIDOCAINE HYDROCHLORIDE 80 MG: 20 INJECTION, SOLUTION INTRAVENOUS at 07:08

## 2024-08-23 RX ADMIN — SUCCINYLCHOLINE CHLORIDE 160 MG: 20 INJECTION, SOLUTION INTRAMUSCULAR; INTRAVENOUS at 07:08

## 2024-08-23 RX ADMIN — PHENYLEPHRINE HYDROCHLORIDE 150 MCG: 10 INJECTION INTRAVENOUS at 07:08

## 2024-08-23 RX ADMIN — FENTANYL CITRATE 50 MCG: 50 INJECTION, SOLUTION INTRAMUSCULAR; INTRAVENOUS at 07:08

## 2024-08-23 RX ADMIN — FENTANYL CITRATE 50 MCG: 50 INJECTION, SOLUTION INTRAMUSCULAR; INTRAVENOUS at 06:08

## 2024-08-23 RX ADMIN — DEXAMETHASONE SODIUM PHOSPHATE 4 MG: 4 INJECTION, SOLUTION INTRA-ARTICULAR; INTRALESIONAL; INTRAMUSCULAR; INTRAVENOUS; SOFT TISSUE at 07:08

## 2024-08-23 RX ADMIN — Medication 60 MG: at 07:08

## 2024-08-23 NOTE — PROGRESS NOTES
H&P completed on paper has been reviewed, the patient has been examined and:  I concur with the findings and no changes have occurred since H&P was written.    There are no hospital problems to display for this patient.

## 2024-08-23 NOTE — ANESTHESIA POSTPROCEDURE EVALUATION
Anesthesia Post Evaluation    Patient: Bandar Foley    Procedure(s) Performed: Procedure(s) (LRB):  Kyphoplasty (N/A)    Final Anesthesia Type: general      Patient location during evaluation: PACU  Patient participation: Yes- Able to Participate  Level of consciousness: awake and alert and oriented  Post-procedure vital signs: reviewed and stable  Pain management: adequate  Airway patency: patent  RICCI mitigation strategies: Verification of full reversal of neuromuscular block  PONV status at discharge: No PONV  Anesthetic complications: no      Cardiovascular status: blood pressure returned to baseline and stable  Respiratory status: spontaneous ventilation and unassisted  Hydration status: euvolemic  Follow-up not needed.  Comments: [facility]              Vitals Value Taken Time   /65 08/23/24 0939   Temp 36.4 °C (97.6 °F) 08/23/24 0902   Pulse 70 08/23/24 0940   Resp 12 08/23/24 0853   SpO2 91 % 08/23/24 0940   Vitals shown include unfiled device data.      Event Time   Out of Recovery 08:57:00         Pain/Loni Score: Loni Score: 10 (8/23/2024 10:00 AM)  Modified Loni Score: 20 (8/23/2024 10:00 AM)

## 2024-08-23 NOTE — TRANSFER OF CARE
"Anesthesia Transfer of Care Note    Patient: Bandar Foley    Procedure(s) Performed: Procedure(s) (LRB):  Kyphoplasty (N/A)    Patient location: PACU    Anesthesia Type: general    Transport from OR: Transported from OR on 2-3 L/min O2 by NC with adequate spontaneous ventilation    Post pain: adequate analgesia    Post assessment: no apparent anesthetic complications    Post vital signs: stable    Level of consciousness: responds to stimulation    Nausea/Vomiting: no nausea/vomiting    Complications: none    Transfer of care protocol was followed      Last vitals: Visit Vitals  /79   Pulse 70   Temp 36.7 °C (98.1 °F) (Oral)   Resp 17   Ht 5' 11" (1.803 m)   Wt 95.8 kg (211 lb 3.2 oz)   SpO2 100%   BMI 29.46 kg/m²     "

## 2024-08-23 NOTE — BRIEF OP NOTE
[facility] - Periop Services  Brief Operative Note    SUMMARY     Surgery Date: 8/23/2024     Surgeons and Role:     * Alejandra Smith MD - Primary    Assisting Surgeon: Kendrick Christopher PA-C    Pre-op Diagnosis:  Closed fracture of lumbar vertebra without spinal cord injury, initial encounter [S32.009A]  Lumbar radiculopathy [M54.16]    Post-op Diagnosis:  Post-Op Diagnosis Codes:     * Closed fracture of lumbar vertebra without spinal cord injury, initial encounter [S32.009A]     * Lumbar radiculopathy [M54.16]    Procedure(s) (LRB):  Kyphoplasty (N/A)    L1 kyphoplasty    Anesthesia: General    Implants:  * No implants in log *    Operative Findings: Dictated    Estimated Blood Loss: * No values recorded between 8/23/2024  7:33 AM and 8/23/2024  7:41 AM *    Estimated Blood Loss has been documented.         Specimens:   Specimen (24h ago, onward)      None            [id number]

## 2024-08-23 NOTE — ANESTHESIA PREPROCEDURE EVALUATION
"                                                                                                             08/23/2024  Bandar Foley is a 64 y.o., male.presents with extensive surgical hx on his back, chronic back pain and radiculopathy(see below).    Diagnosis:      Closed fracture of lumbar vertebra without spinal cord injury, initial encounter [S32.009A]      Lumbar radiculopathy [M54.16]   Pre-op diagnosis:      Closed fracture of lumbar vertebra without spinal cord injury, initial encounter [S32.009A]      Lumbar radiculopathy [M54.16]     The pt. comes to [facility] for the noted procedure under GA/LMA vs GETA.  Case: [id number] Date/Time: 08/23/24 0645   Procedure: Kyphoplasty (Back) - L1   KYPHOPLASTY // C ARM // PRONE ZULEYMA // DEQUAN ARGUETA // IDALIA   Anesthesia type: General               PMHx:  Multiple pulmonary emboli    Other Medical History   Anticoagulant long-term use DVT (deep venous thrombosis)   Hypercholesterolemia Lumbar vertebral fracture   Encounter for blood transfusion Neuropathy   Foot drop, bilateral      PSHx:  Surgical History:  LUMBAR LAMINECTOMY FOR DECOMPRESSION LUIS E FILTER PLACEMENT   EVACUATION OF HEMATOMA BACK SURGERY           Vital signs:  Height: 5' 11" (1.803 m) (08/12/24) Weight: 95.3 kg (210 lb) (08/12/24)   BMI: 29.3 IBW: 75.3 kg (165 lb 14.8 oz)         Lab Data:      EKG:      Pre-op Assessment    I have reviewed the Patient Summary Reports.     I have reviewed the Nursing Notes. I have reviewed the NPO Status.   I have reviewed the Medications.     Review of Systems  Anesthesia Hx:  No problems with previous Anesthesia                Social:  Non-Smoker       Hematology/Oncology:  Hematology Normal   Oncology Normal                                   EENT/Dental:  EENT/Dental Normal           Cardiovascular:  Cardiovascular Normal Exercise tolerance: good                   Functional Capacity good / => 4 METS                         Pulmonary:  Pulmonary Normal            "            Renal/:  Renal/ Normal                 Hepatic/GI:  Hepatic/GI Normal                 Musculoskeletal:  Musculoskeletal Normal                Neurological:  Neurology Normal                                      Endocrine:  Endocrine Normal            Dermatological:  Skin Normal    Psych:  Psychiatric Normal                    Physical Exam  General: Alert, Oriented, Well nourished and Cooperative    Airway:  Mallampati: II   Mouth Opening: Normal  TM Distance: Normal  Tongue: Normal  Neck ROM: Normal ROM    Dental:  Intact    Chest/Lungs:  Clear to auscultation, Normal Respiratory Rate    Heart:  Rate: Normal  Rhythm: Regular Rhythm        Anesthesia Plan  Type of Anesthesia, risks & benefits discussed:    Anesthesia Type: Gen ETT  Intra-op Monitoring Plan: Standard ASA Monitors  Post Op Pain Control Plan: IV/PO Opioids PRN  Induction:  IV and Inhalation  Airway Plan: Direct  Informed Consent: Informed consent signed with the Patient and all parties understand the risks and agree with anesthesia plan.  All questions answered. Patient consented to blood products? Yes  ASA Score: 2  Day of Surgery Review of History & Physical: H&P Update referred to the surgeon/provider.    Ready For Surgery From Anesthesia Perspective.     .

## 2024-08-23 NOTE — OP NOTE
[facility]                       [address] [address]    PATIENT NAME:      AMARJIT FLOERS  YOB: 1960  CSN:               [id number]  MRN:               [id number]  ADMIT DATE:        08/23/2024 04:51:00  PHYSICIAN:         Alejandra Smith MD                          OPERATIVE REPORT      DATE OF SURGERY:        SURGEON:  Alejandra Smith MD    PREOPERATIVE DIAGNOSIS:  L1 compression fracture, status post fusion from L1-S1.    POSTOPERATIVE DIAGNOSIS:  L1 compression fracture, status post fusion from   L1-S1.    PROCEDURE:  L1 kyphoplasty using PanMed system 2 to 3 mL put on each side.    There were no additional complications.  Monitoring was used.  Fluoroscopy was   used.    INDICATION FOR SURGERY:  The patient is a gentleman with previous surgery fusion,   slowly improved, but now has an L1 compression fracture, who is now here for kyphoplasty.    Consent was discussed.  Risks were discussed including bleeding, infection,   weakness, paralysis, hemorrhage was discussed in detail.  They want me to   proceed with surgery.  We spent some time going over all the risks and benefits.    DESCRIPTION OF PROCEDURE:  The patient was brought to the operating room,   intubated, turned prone.  Back was prepped and draped.  We went laterally L1 on   the x-ray extra pedicular into the pedicle and then gradually into the bone   slowly going slightly above the previous pedicle screws, put into the bone to   the top part of the pedicle and transpedicular.  Once we had done that, we   drilled and put a balloon and inflated it and then subsequently when everything   looked good in AP and lateral x-rays, we deflated the balloon and put cement.    It was a PanMed system 2-3 mL was put on each side.  There were no additional   complications.  No change in monitoring.  Final x-ray looked good.  The needles   were  removed.        ______________________________  MD TUTU Suh/IZAIAH  DD:  08/23/2024  Time:  07:53AM  DT:  08/23/2024  Time:  12:17PM  Job #:  [id number]      OPERATIVE REPORT

## 2024-08-23 NOTE — DISCHARGE INSTRUCTIONS
-NO driving and NO alcohol consumption for 24 hours and while taking narcotic pain medications.    -Keep incisions clean and dry for 48 hours. OK to shower afterwards. Do not tub bathe or submerge incision under water until cleared by MD.    -NO heavy lifting. DO NOT lift objects greater than 10lbs. Use caution when lifting, bending, pulling, pushing, etc.    -Monitor site for infection: redness, swelling, drainage/pus/foul odor, fever, chills.    BLEEDING: if you experience uncontrollable bleeding, contact your doctor and go to ER.    NAUSEA: due to the anesthesia, you may experience nausea for up to 24 hours. If nausea and vomiting last longer, contact your doctor.     INFECTION:  watch for any signs or symptoms of infection such as chills, fever, redness or drainage at surgical site. Notify your doctor.     PAIN : take your pain medications as directed. If the pain medications are not helping, notify your doctor.

## 2024-08-23 NOTE — ANESTHESIA PROCEDURE NOTES
Intubation    Date/Time: 8/23/2024 7:23 AM    Performed by: Pushpa Cortez CRNA  Authorized by: Nelson Collazo MD    Intubation:     Induction:  Intravenous    Intubated:  Postinduction    Mask Ventilation:  Easy with oral airway    Attempts:  1    Attempted By:  CRNA    Method of Intubation:  Video laryngoscopy    Blade:  Vincent 4    Laryngeal View Grade: Grade I - full view of cords      Difficult Airway Encountered?: No      Complications:  None    Airway Device:  Oral endotracheal tube    Airway Device Size:  7.5    Style/Cuff Inflation:  Cuffed (inflated to minimal occlusive pressure)    Inflation Amount (mL):  5    Tube secured:  22    Secured at:  The lips    Placement Verified By:  Capnometry    Complicating Factors:  None    Findings Post-Intubation:  BS equal bilateral and atraumatic/condition of teeth unchanged

## 2024-08-24 ENCOUNTER — PATIENT MESSAGE (OUTPATIENT)
Dept: ADMINISTRATIVE | Facility: OTHER | Age: 64
End: 2024-08-24
Payer: MEDICARE

## 2024-08-25 ENCOUNTER — PATIENT MESSAGE (OUTPATIENT)
Dept: ADMINISTRATIVE | Facility: OTHER | Age: 64
End: 2024-08-25
Payer: MEDICARE

## 2024-08-26 VITALS
DIASTOLIC BLOOD PRESSURE: 65 MMHG | BODY MASS INDEX: 29.56 KG/M2 | SYSTOLIC BLOOD PRESSURE: 132 MMHG | WEIGHT: 211.19 LBS | RESPIRATION RATE: 14 BRPM | HEART RATE: 70 BPM | HEIGHT: 71 IN | TEMPERATURE: 98 F | OXYGEN SATURATION: 91 %

## 2024-08-26 NOTE — DISCHARGE SUMMARY
[facility] - Peri Services  Neurosurgery  Discharge Summary      Patient Name: Bandar Foley  MRN: [id number]  Admission Date: 8/23/2024  Hospital Length of Stay: 0 days  Discharge Date and Time: 8/23/2024 10:21 AM  Attending Physician: Alejandra Smith MD  Discharging Provider: SHIRA Brunson  Primary Care Provider: Will Hartman III, APRN-CNP         Procedure(s) (LRB):  Kyphoplasty (N/A)     Hospital Course: Mr. Foley was brought into the hospital for L1 kyphoplasty on 8/23/24. Her tolerated the procedure without complication. He was brought to recovery for observation. He was tolerating PO, his pain was controlled, and he was ambulating. He was discharged from recovery. He was given instruction for wound care and post op precautions. He was given a prescription for pain medication and muscle relaxer. He was discharged home with a 2 week post op follow up scheduled in office.      Consults:     Significant Diagnostic Studies: N/A    Pending Diagnostic Studies:       None          There are no hospital problems to display for this patient.     Discharged Condition: good    Disposition: Home or Self Care    Follow Up:   Follow-up Information       Alejandra Smith MD. Call.    Specialty: Neurosurgery  Contact information:  [address] [facility] [address] [phone]                           Patient Instructions:      Diet general     Call MD for:  temperature >100.4     Call MD for:  severe uncontrolled pain     Call MD for:  redness, tenderness, or signs of infection (pain, swelling, redness, odor or green/yellow discharge around incision site)     Remove dressing in 48 hours     Medications:  Reconciled Home Medications:      Medication List        ASK your doctor about these medications      enoxaparin 100 mg/mL Syrg  Commonly known as: LOVENOX  Inject 90 mg into the skin 2 (two) times daily. Dose to be determined at preop appt with Dr. Wyatt on 8/16/24, will be a bridge for coumadin.  Will start 8/22/24     finasteride 5 mg tablet  Commonly known as: PROSCAR  Take 5 mg by mouth once daily.     furosemide 40 MG tablet  Commonly known as: LASIX  Take 40 mg by mouth every morning.     gabapentin 400 MG capsule  Commonly known as: NEURONTIN  Take 400 mg by mouth 3 (three) times daily.     mupirocin 2 % ointment  Commonly known as: BACTROBAN  Apply topically 3 (three) times daily.     naloxone 4 mg/actuation Spry  Commonly known as: NARCAN  1 spray by Nasal route once. As needed for opioid overdose     oxyCODONE-acetaminophen  mg per tablet  Commonly known as: PERCOCET  Take 1 tablet by mouth every 4 (four) hours as needed for Pain.     tamsulosin 0.4 mg Cap  Commonly known as: FLOMAX  Take 1 capsule by mouth.     * warfarin 5 MG tablet  Commonly known as: COUMADIN  Take 5 mg by mouth every Mon, Wed, Fri.     * warfarin 5 MG tablet  Commonly known as: COUMADIN  Take 0.5 tablets by mouth every Tuesday, Thursday, Saturday, Sunday.           * This list has 2 medication(s) that are the same as other medications prescribed for you. Read the directions carefully, and ask your doctor or other care provider to review them with you.                  SHIRA Brunson  Neurosurgery  [facility] - Periop Services

## 2024-09-20 DIAGNOSIS — I82.4Y3 ACUTE DEEP VEIN THROMBOSIS (DVT) OF PROXIMAL VEIN OF BOTH LOWER EXTREMITIES: Primary | ICD-10-CM

## 2024-10-04 PROBLEM — I82.4Y3 ACUTE DEEP VEIN THROMBOSIS (DVT) OF PROXIMAL VEIN OF BOTH LOWER EXTREMITIES: Status: ACTIVE | Noted: 2024-10-04

## 2024-10-04 PROBLEM — D68.52 PROTHROMBIN GENE MUTATION: Status: ACTIVE | Noted: 2024-01-05

## 2024-10-04 NOTE — PROGRESS NOTES
Subjective:        PATIENT: Bandar Foley  MRN: [id number]  DATE: 10/7/2024    PCP: Will Hartman III, APRN-CNP   Referring Provider : Alice Castillo NP  [address] [address]     Chief Complaint: Pain (Patient stated having lower back pain into legs. where surgery was done. Patient stated its getting better but not there yet. Patient stated pain level is a level 5. Patient stated no feeling from ankle on down and can not move toes.)    Currently on warfarin:  2.5 mg on Tuesday Thursday Saturday: 5 mg the rest of the week        10/07/2024  This is a 64-year-old male he was transferring care.   He is currently on anticoagulation with Coumadin because of a history of multiple clots  He was heterozygous for factor 2 gene mutation.      He is paraplegic secondary to severe spinal stenosis she had a post laminectomy on 03/15/2024.  When acute subacute L1 fracture.      Looks like he was initially diagnosed with a PE in December of 2023.  And was discharged on Eliquis    He underwent placement of a temporary IVC filter by Dr. Baum on 03/10/2024.  Was placed on therapeutic Lovenox on 03/10.  And underwent bilateral decompressive laminectomies on 03/15/2024.    Then had to have a redo laminectomy on 03/17/2024.  He developed thrombosis post splenectomy but he was not on anticoagulation due to postop can see you for bleeding    There was a mention of him failing Eliquis.  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  Past Surgical History:   Procedure Laterality Date    BACK SURGERY      2 prior  to 3/15/24 surgery    EVACUATION OF HEMATOMA  03/17/2024    following  back surgery    FIXATION KYPHOPLASTY N/A 8/23/2024    Procedure: Kyphoplasty;  Surgeon: Alejandra Smith MD;  Location: [facility];  Service: Neurosurgery;  Laterality: N/A;  L1   KYPHOPLASTY // C ARM // PRONE ZULEYMA // DEQUAN ARGUETA // NDM    LUIS E FILTER PLACEMENT  03/11/2024    LUMBAR LAMINECTOMY FOR DECOMPRESSION  03/15/2024    L2-L3 with fusion      .No family history on file.   Social History     Socioeconomic History    Marital status:    Tobacco Use    Smoking status: Former     Types: Cigarettes    Smokeless tobacco: Former   Substance and Sexual Activity    Alcohol use: Not Currently    Drug use: Yes     Types: Oxycodone     Comment: back pain    Sexual activity: Not Currently     Social Drivers of Health     Food Insecurity: No Food Insecurity (4/8/2024)    [facility] Subsidi[facility] Affili[facility]    Hunger Vital Sign     Worried About Running Out of Food in the Last Year: Never true     Ran Out of Food in the Last Year: Never true   Transportation Needs: No Transportation Needs (4/8/2024)    Received from [facility] Subsidiaries and Affiliates    EVAN -  Transportation     Lack of Transportation (Medical): No     Lack of Transportation (Non-Medical): No      .  Review of patient's allergies indicates:   Allergen Reactions    Dilaudid [hydromorphone] Nausea And Vomiting        Current Outpatient Medications:     finasteride (PROSCAR) 5 mg tablet, Take 5 mg by mouth once daily., Disp: , Rfl:     furosemide (LASIX) 40 MG tablet, Take 40 mg by mouth every morning., Disp: , Rfl:     gabapentin (NEURONTIN) 400 MG capsule, Take 400 mg by mouth 3 (three) times daily., Disp: , Rfl:     mupirocin (BACTROBAN) 2 % ointment, Apply topically 3 (three) times daily., Disp: 44 g, Rfl: 0    oxyCODONE-acetaminophen (PERCOCET)  mg per tablet, Take 1 tablet by mouth every 4 (four) hours as needed for Pain., Disp: 20 tablet, Rfl: 0    tamsulosin (FLOMAX) 0.4 mg Cap, Take 1 capsule by mouth., Disp: , Rfl:     warfarin (COUMADIN) 5 MG tablet, Take 5 mg by mouth every Mon, Wed, Fri., Disp: , Rfl:     naloxone (NARCAN) 4 mg/actuation Spry, 1 spray by Nasal route once. As needed for opioid overdose (Patient not taking: Reported on 10/7/2024), Disp: , Rfl:     warfarin (COUMADIN) 5 MG tablet, Take 0.5 tablets by mouth every Tuesday, Thursday, Saturday, Sunday., Disp: , Rfl:    Review of Systems      See HPI  Objective:     Vitals:    10/07/24 1127   BP: 121/82   Pulse: 67   Temp: 97.7 °F (36.5 °C)         Physical Exam  Vitals reviewed.   Constitutional:       Appearance: Normal appearance. He is not ill-appearing.   HENT:      Head: Normocephalic and atraumatic.      Nose: Nose normal.      Mouth/Throat:      Mouth: Mucous membranes are moist.   Cardiovascular:      Rate and Rhythm: Normal rate.   Pulmonary:      Effort: Pulmonary effort is normal.   Abdominal:      General: Abdomen is flat.      Palpations: Abdomen is soft.   Musculoskeletal:      Cervical back: Neck supple.      Right lower leg: Edema present.      Left lower leg: Edema present.      Comments: Patient needs moderate  assistance with both upper extremities to stand.  He ambulates with a antalgic gait and bilateral AFOs and a rolling walker.  He was able to pick his legs up off the ground.  But has no sensation in his feet.   Skin:     General: Skin is dry.   Neurological:      Mental Status: He is alert and oriented to person, place, and time.      GCS: GCS eye subscore is 4. GCS verbal subscore is 5.      Cranial Nerves: No cranial nerve deficit.      Gait: Gait abnormal.   Psychiatric:         Attention and Perception: Attention normal.         Mood and Affect: Mood normal.         Speech: Speech normal.         Behavior: Behavior is cooperative.       ECOG SCORE    2 - Capable of all selfcare but unable to carry out any work activities, active > 50% of hours, 1 - Restricted in strenuous activity-ambulatory and able to carry out work of a light nature        .Lab Review:  in EPIC and old notes  Labs and imaging reviewed in epic  12/22/2023: Protein C activity 127  Protein S activity 117 normal  Antithrombin 3 normal  Cardiolipin IgA G and M normal  12/20/2023: Lupus anticoagulant reflux: New no lupus anticoagulant detected    01/05/2024: Factor 5 Leiden not detected    01/05/2024: Heterozygous factor 2 mutation       Old imaging  12/03/2023: CTA:  Extensive pulmonary emboli with pulmonary artery dilated 3.8 cm with pulmonary hypertension  12/03/2023: Ultrasound duplex bilaterally: No DVT    12/18/2023: CT angiogram: Diffuse pulmonary emboli    03/12/2024: Ultrasound Doppler: No evidence of bilateral DVT    03/17/2024: CT angio chest:  There does appear to be a thrombus in the 2nd order lower lobe.  No evidence of right heart strain    03/17/2024: CT angio abdomen:  Recent vena cava filter (findings suggestive of occlusion of the inferior vena cava at the level of the filter, atherosclerotic calcification of the aorta and major branches    03/17/2024: Complete thrombosis of the venous system suspected thrombosed inferior vena  cava    03/17/2024:  Interventional radiology, suction thrombectomy of the inferior vena cava below the filter    03/18/2024: Ultrasound bilateral upper extremities: No evidence of DVT    03/22/2024: Ultrasound bilateral lower extremities, negative for DVT    04/14/2024:  Venous lower extremity Doppler, partial occlusive thrombus right common femoral vein with occlusive thrombus right distal common femoral vein and popliteal vein    04/06/2024:  Thrombectomy inferior vena cava    06/05/2024: CT angio, small bilateral pulmonary emboli decreasing clot burden since March of 2024 06/05/2024: Ultrasound bilateral lower extremities,: Right peroneal vein unless superficial femoral vein DVT decreased from 04/04    Assessment/Plan:   On  anticoagulation   History of prothrombin mutation ---  History of IVC filter   H/o anemia         Recommendations   Continue lifelong anticoagulation   INR 2-3  Recheck labs today.  Avoid multivitamins.     Explained If any one  puts him on medication he should have his INR checked within 2-3 days.    He should always clear any medication over-the-counter with his pharmacist  Avoid concomitant medications that cause bleeding,   warfarin diet   Compliance with medication  Lovenox bridging for procedures   Refer back to Interventional Radiology to consider removal of his IVC filter.  Or his vascular surgeon---however this has been quite some time      I will send him back to Dr. Sharma-- to discuss removal--explained it may not be able to be revoved  Keep Warfarin Cliinic   He will call his insurance company to see if he can qualify for machine    He will f/u with neurology /surgery with his persistent neurological changes and back pain.           Follow up in about 6 months (around 4/7/2025) for with cbc/pt/inr.   Orders Placed This Encounter    CBC Auto Differential    Ferritin    Folate    Vitamin B12    Iron and TIBC    PT/INR        Alex Gibson,:MD    Total time 60 minutes:  35  minutes  minutes spent with the patient today.  25 minutes in review of outside records and labs

## 2024-10-07 ENCOUNTER — OFFICE VISIT (OUTPATIENT)
Dept: HEMATOLOGY/ONCOLOGY | Facility: CLINIC | Age: 64
End: 2024-10-07
Payer: MEDICARE

## 2024-10-07 VITALS
HEART RATE: 67 BPM | WEIGHT: 215 LBS | OXYGEN SATURATION: 99 % | BODY MASS INDEX: 30.1 KG/M2 | HEIGHT: 71 IN | DIASTOLIC BLOOD PRESSURE: 82 MMHG | TEMPERATURE: 98 F | SYSTOLIC BLOOD PRESSURE: 121 MMHG

## 2024-10-07 DIAGNOSIS — D68.52 PROTHROMBIN GENE MUTATION: Primary | ICD-10-CM

## 2024-10-07 DIAGNOSIS — I82.4Y3 ACUTE DEEP VEIN THROMBOSIS (DVT) OF PROXIMAL VEIN OF BOTH LOWER EXTREMITIES: ICD-10-CM

## 2024-10-07 DIAGNOSIS — D64.9 ANEMIA, UNSPECIFIED TYPE: ICD-10-CM

## 2024-10-07 DIAGNOSIS — D68.52 PROTHROMBIN GENE MUTATION: ICD-10-CM

## 2024-10-07 DIAGNOSIS — I82.4Y3 ACUTE DEEP VEIN THROMBOSIS (DVT) OF PROXIMAL VEIN OF BOTH LOWER EXTREMITIES: Primary | ICD-10-CM

## 2024-10-07 DIAGNOSIS — Z79.01 WARFARIN ANTICOAGULATION: ICD-10-CM

## 2024-10-07 DIAGNOSIS — Z95.828 PRESENCE OF IVC FILTER: ICD-10-CM

## 2024-10-07 LAB
BASOPHILS # BLD AUTO: 0.06 X10(3)/MCL
BASOPHILS NFR BLD AUTO: 1 %
EOSINOPHIL # BLD AUTO: 0.38 X10(3)/MCL (ref 0–0.9)
EOSINOPHIL NFR BLD AUTO: 6 %
ERYTHROCYTE [DISTWIDTH] IN BLOOD BY AUTOMATED COUNT: 14.3 % (ref 11.5–17)
FERRITIN SERPL-MCNC: 58.72 NG/ML (ref 21.81–274.66)
FOLATE SERPL-MCNC: 7.5 NG/ML (ref 7–31.4)
HCT VFR BLD AUTO: 43.2 % (ref 42–52)
HGB BLD-MCNC: 14 G/DL (ref 14–18)
IMM GRANULOCYTES # BLD AUTO: 0.01 X10(3)/MCL (ref 0–0.04)
IMM GRANULOCYTES NFR BLD AUTO: 0.2 %
INR PPP: 3.7
IRON SATN MFR SERPL: 25 % (ref 20–50)
IRON SERPL-MCNC: 69 UG/DL (ref 65–175)
LYMPHOCYTES # BLD AUTO: 2.13 X10(3)/MCL (ref 0.6–4.6)
LYMPHOCYTES NFR BLD AUTO: 33.8 %
MCH RBC QN AUTO: 29.3 PG (ref 27–31)
MCHC RBC AUTO-ENTMCNC: 32.4 G/DL (ref 33–36)
MCV RBC AUTO: 90.4 FL (ref 80–94)
MONOCYTES # BLD AUTO: 0.55 X10(3)/MCL (ref 0.1–1.3)
MONOCYTES NFR BLD AUTO: 8.7 %
NEUTROPHILS # BLD AUTO: 3.18 X10(3)/MCL (ref 2.1–9.2)
NEUTROPHILS NFR BLD AUTO: 50.3 %
PLATELET # BLD AUTO: 358 X10(3)/MCL (ref 130–400)
PMV BLD AUTO: 10.3 FL (ref 7.4–10.4)
PROTHROMBIN TIME: 36.3 SECONDS (ref 12.5–14.5)
RBC # BLD AUTO: 4.78 X10(6)/MCL (ref 4.7–6.1)
TIBC SERPL-MCNC: 205 UG/DL (ref 69–240)
TIBC SERPL-MCNC: 274 UG/DL (ref 250–450)
TRANSFERRIN SERPL-MCNC: 256 MG/DL (ref 163–344)
VIT B12 SERPL-MCNC: 460 PG/ML (ref 213–816)
WBC # BLD AUTO: 6.31 X10(3)/MCL (ref 4.5–11.5)

## 2024-10-07 PROCEDURE — 82746 ASSAY OF FOLIC ACID SERUM: CPT | Performed by: INTERNAL MEDICINE

## 2024-10-07 PROCEDURE — 83550 IRON BINDING TEST: CPT | Performed by: INTERNAL MEDICINE

## 2024-10-07 PROCEDURE — 99205 OFFICE O/P NEW HI 60 MIN: CPT | Mod: S$PBB,,, | Performed by: INTERNAL MEDICINE

## 2024-10-07 PROCEDURE — 99999 PR PBB SHADOW E&M-EST. PATIENT-LVL V: CPT | Mod: PBBFAC,,, | Performed by: INTERNAL MEDICINE

## 2024-10-07 PROCEDURE — 85025 COMPLETE CBC W/AUTO DIFF WBC: CPT | Performed by: INTERNAL MEDICINE

## 2024-10-07 PROCEDURE — 85610 PROTHROMBIN TIME: CPT | Performed by: INTERNAL MEDICINE

## 2024-10-07 PROCEDURE — 36415 COLL VENOUS BLD VENIPUNCTURE: CPT | Performed by: INTERNAL MEDICINE

## 2024-10-07 PROCEDURE — 82607 VITAMIN B-12: CPT | Performed by: INTERNAL MEDICINE

## 2024-10-07 PROCEDURE — 99215 OFFICE O/P EST HI 40 MIN: CPT | Mod: PBBFAC | Performed by: INTERNAL MEDICINE

## 2024-10-07 PROCEDURE — 82728 ASSAY OF FERRITIN: CPT | Performed by: INTERNAL MEDICINE

## 2024-11-14 ENCOUNTER — LAB REQUISITION (OUTPATIENT)
Dept: LAB | Facility: HOSPITAL | Age: 64
End: 2024-11-14
Payer: MEDICARE

## 2024-11-14 DIAGNOSIS — L30.9 DERMATITIS, UNSPECIFIED: ICD-10-CM

## 2024-11-14 PROCEDURE — 87070 CULTURE OTHR SPECIMN AEROBIC: CPT | Performed by: PHYSICIAN ASSISTANT

## 2024-11-17 LAB — BACTERIA WND CULT: NORMAL

## 2024-11-19 ENCOUNTER — TELEPHONE (OUTPATIENT)
Dept: HEMATOLOGY/ONCOLOGY | Facility: CLINIC | Age: 64
End: 2024-11-19
Payer: MEDICARE

## 2024-11-19 NOTE — TELEPHONE ENCOUNTER
Patient would like to get the RSV vaccine, but wanted to clear it with you first. States he has hx of PE and wanted to make sure that this would be okay to take. Please advise.

## 2024-12-04 DIAGNOSIS — M54.16 LUMBAR RADICULOPATHY: ICD-10-CM

## 2024-12-04 DIAGNOSIS — S32.009A CLOSED FRACTURE OF LUMBAR VERTEBRA WITHOUT SPINAL CORD INJURY: Primary | ICD-10-CM

## 2024-12-10 ENCOUNTER — HOSPITAL ENCOUNTER (EMERGENCY)
Facility: HOSPITAL | Age: 64
Discharge: HOME | End: 2024-12-10
Attending: STUDENT IN AN ORGANIZED HEALTH CARE EDUCATION/TRAINING PROGRAM
Payer: MEDICARE

## 2024-12-10 VITALS
OXYGEN SATURATION: 99 % | HEIGHT: 71 IN | DIASTOLIC BLOOD PRESSURE: 99 MMHG | RESPIRATION RATE: 18 BRPM | HEART RATE: 86 BPM | BODY MASS INDEX: 29.4 KG/M2 | WEIGHT: 210 LBS | SYSTOLIC BLOOD PRESSURE: 142 MMHG | TEMPERATURE: 98 F

## 2024-12-10 DIAGNOSIS — M54.50 CHRONIC BILATERAL LOW BACK PAIN WITHOUT SCIATICA: Primary | ICD-10-CM

## 2024-12-10 DIAGNOSIS — G89.29 CHRONIC BILATERAL LOW BACK PAIN WITHOUT SCIATICA: Primary | ICD-10-CM

## 2024-12-10 PROCEDURE — 96372 THER/PROPH/DIAG INJ SC/IM: CPT | Performed by: NURSE PRACTITIONER

## 2024-12-10 PROCEDURE — 99284 EMERGENCY DEPT VISIT MOD MDM: CPT | Mod: 25

## 2024-12-10 PROCEDURE — 63600175 PHARM REV CODE 636 W HCPCS: Performed by: NURSE PRACTITIONER

## 2024-12-10 RX ORDER — MORPHINE SULFATE 4 MG/ML
4 INJECTION, SOLUTION INTRAMUSCULAR; INTRAVENOUS
Status: COMPLETED | OUTPATIENT
Start: 2024-12-10 | End: 2024-12-10

## 2024-12-10 RX ADMIN — MORPHINE SULFATE 4 MG: 4 INJECTION INTRAVENOUS at 10:12

## 2024-12-10 NOTE — FIRST PROVIDER EVALUATION
"Medical screening examination initiated.  I have conducted a focused provider triage encounter, findings are as follows:    Brief history of present illness:  65 yo male presents to ED for evaluation of severe back pain worsening over the past several days.  Patient reports having chronic back pain.  Patient reports having 2 back surgeries with kyphoplasty earlier this year. Denies any saddle anesthesia, loss of bowel or bladder. Neurosurgery, Dr. Smith.     Vitals:    12/10/24 1716   BP: 132/86   Pulse: 109   Resp: 18   Temp: 98.3 °F (36.8 °C)   TempSrc: Oral   SpO2: 98%   Weight: 95.3 kg (210 lb)   Height: 5' 11" (1.803 m)       Pertinent physical exam:  Patient is awake and alert and oriented.  Ambulatory to triage with walker.     Brief workup plan:  Imaging    Preliminary workup initiated; this workup will be continued and followed by the physician or advanced practice provider that is assigned to the patient when roomed.  "

## 2024-12-11 NOTE — ED PROVIDER NOTES
Encounter Date: 12/10/2024       History     Chief Complaint   Patient presents with    Back Pain     Pt to ED via POV. Pt reports having back surgery in May. Pt endorses severe lower back pain X a few months. Pt was sent here for further eval. Denies incontinence.     See MDM    The history is provided by the patient. No  was used.     Review of patient's allergies indicates:   Allergen Reactions    Dilaudid [hydromorphone] Nausea And Vomiting     Past Medical History:   Diagnosis Date    Anticoagulant long-term use     DVT (deep venous thrombosis)     following back surgery    Encounter for blood transfusion     Foot drop, bilateral     Hypercholesterolemia     Lumbar vertebral fracture     Multiple pulmonary emboli     12/18/2023    Neuropathy      Past Surgical History:   Procedure Laterality Date    BACK SURGERY      2 prior  to 3/15/24 surgery    EVACUATION OF HEMATOMA  03/17/2024    following  back surgery    FIXATION KYPHOPLASTY N/A 8/23/2024    Procedure: Kyphoplasty;  Surgeon: Alejandra Smith MD;  Location: [facility];  Service: Neurosurgery;  Laterality: N/A;  L1   KYPHOPLASTY // C ARM // PRONE ZULEYMA // DEQUAN ARGUETA // NDM    LUIS E FILTER PLACEMENT  03/11/2024    LUMBAR LAMINECTOMY FOR DECOMPRESSION  03/15/2024    L2-L3 with fusion     No family history on file.  Social History     Tobacco Use    Smoking status: Former     Types: Cigarettes    Smokeless tobacco: Former   Substance Use Topics    Alcohol use: Not Currently    Drug use: Yes     Types: Oxycodone     Comment: back pain     Review of Systems   Constitutional:  Negative for fever.   Respiratory:  Negative for cough and shortness of breath.    Cardiovascular:  Negative for chest pain.   Gastrointestinal:  Negative for abdominal pain.   Genitourinary:  Negative for difficulty urinating and dysuria.   Musculoskeletal:  Positive for back pain. Negative for gait problem.   Skin:  Negative for color change.   Neurological:   Negative for dizziness, speech difficulty and headaches.   Psychiatric/Behavioral:  Negative for hallucinations and suicidal ideas.    All other systems reviewed and are negative.      Physical Exam     Initial Vitals [12/10/24 1716]   BP Pulse Resp Temp SpO2   132/86 109 18 98.3 °F (36.8 °C) 98 %      MAP       --         Physical Exam    Nursing note and vitals reviewed.  Constitutional: He appears well-developed and well-nourished.   HENT:   Head: Normocephalic.   Eyes: EOM are normal.   Neck: Neck supple.   Normal range of motion.  Cardiovascular:  Normal rate, regular rhythm, normal heart sounds and intact distal pulses.           Pulmonary/Chest: Breath sounds normal.   Abdominal: Abdomen is soft. Bowel sounds are normal.   Musculoskeletal:         General: Normal range of motion.      Cervical back: Normal range of motion and neck supple.     Neurological: He is alert and oriented to person, place, and time. He has normal strength.   Skin: Skin is warm and dry. Capillary refill takes less than 2 seconds.   Psychiatric: He has a normal mood and affect. His behavior is normal. Judgment and thought content normal.         ED Course   Procedures  Labs Reviewed - No data to display       Imaging Results              CT Lumbar Spine Without Contrast (Final result)  Result time 12/10/24 18:07:26      Final result by Joan Mesa MD (12/10/24 18:07:26)                   Impression:      1. No acute fracture identified.  2. Postoperative and degenerative changes of the lumbar spine.      Electronically signed by: Joan Mesa  Date:    12/10/2024  Time:    18:07               Narrative:    EXAMINATION:  CT LUMBAR SPINE WITHOUT CONTRAST    CLINICAL HISTORY:  Low back pain, symptoms persist with > 6wks conservative treatment;    TECHNIQUE:  Noncontrast CT images of the lumbar spine. Axial, coronal, and sagittal reformatted images were obtained. Dose length product is 829 mGycm. Automatic exposure control,  adjustment of mA/kV or iterative reconstruction technique was used to limit radiation dose.    COMPARISON:  CT and MRI lumbar spine dated 06/10/2024    FINDINGS:  There are 5 non-rib-bearing lumbar type bodies.  There are postoperative changes posterior spinal fusion hardware at L1 through S1 and laminectomies at L1-L3.  There are lucencies around the bilateral L1 screws measuring up to 4 mm in thickness.  The hardware is otherwise intact.  There are changes of interval kyphoplasty at L1 with chronic compression deformity and minimal loss of height.  There is grade 1 retrolisthesis of L2 over L3, unchanged.  There is no acute fracture identified.  There is moderate to severe neural foraminal narrowing on the left at L1-L2, moderate on the right at L2-L3 and L3-L4.  There are postoperative changes the paraspinal soft tissues.                                       Medications   morphine injection 4 mg (has no administration in time range)     Medical Decision Making  Historian:  Patient.  Patient is a White 64 y.o. male that presents with lower back pain that has been present few months. Associated symptoms nothing. Surrounding information is patient had back surgery in May and a kyphoplasty in August.  He has permanent nerve damage to bilateral lower extremities with permanent foot drop.  He is currently in an some ankle braces. Exacerbated by nothing. Relieved by nothing. Patient treatment prior to arrival surgery kyphoplasty. Risk factors include none. Other history pertaining to this complaint nothing.   Assessment:  See physical exam.  DD:  Back pain  ED Course: History was obtained.  Physical was performed.  Patient has no new neurological deficits.  He has no saddle paresthesia.  He has no bowel or bladder incontinence.  I did discuss the case with Neurosurgery. Medical or surgical consults:  Neurosurgery. Social determinants that affect healthcare:  None.       Amount and/or Complexity of Data  Reviewed  Radiology:      Details: No new acute findings on CT  Discussion of management or test interpretation with external provider(s): I discussed this case with Dr. Rios, neurosurgery, and he recommends patient continue his outpatient follow up with Dr. Smith and await his MRI as scheduled                                      Clinical Impression:  Final diagnoses:  [M54.50, G89.29] Chronic bilateral low back pain without sciatica (Primary)          ED Disposition Condition    Discharge Stable          ED Prescriptions    None       Follow-up Information       Follow up With Specialties Details Why Contact Info    Alejandra Smith MD Neurosurgery Call in 3 days ed follow up [address] [place] [id number]  [phone]               Nishant Salvador, FNP  12/10/24 [id number]

## 2024-12-20 ENCOUNTER — APPOINTMENT (OUTPATIENT)
Dept: RADIOLOGY | Facility: HOSPITAL | Age: 64
End: 2024-12-20
Attending: NEUROLOGICAL SURGERY
Payer: MEDICARE

## 2024-12-20 DIAGNOSIS — M54.16 LUMBAR RADICULOPATHY: ICD-10-CM

## 2024-12-20 DIAGNOSIS — S32.009A CLOSED FRACTURE OF LUMBAR VERTEBRA WITHOUT SPINAL CORD INJURY: ICD-10-CM

## 2024-12-20 PROCEDURE — 72148 MRI LUMBAR SPINE W/O DYE: CPT | Mod: TC

## 2025-03-20 ENCOUNTER — TELEPHONE (OUTPATIENT)
Dept: HEMATOLOGY/ONCOLOGY | Facility: CLINIC | Age: 65
End: 2025-03-20
Payer: MEDICARE

## 2025-03-20 NOTE — TELEPHONE ENCOUNTER
Patient states he will be having back surgery within the next few weeks; not scheduled yet. He is asking for instructions regarding coumadin / lovenox bridge. Next OV here is 4/25/25. Please advise.

## 2025-04-24 NOTE — PROGRESS NOTES
Subjective:        PATIENT: Bandar Foley  MRN: [id number]  DATE: 4/25/2025    PCP: Will Hartman III, APRN-CNP   Referring Provider : No referring provider defined for this encounter.     Chief Complaint: 6m f/u    Currently on warfarin:  2.5 mg on Tuesday Thursday Saturday and Sunday: 5 mg Monday and Friday 04/25/2025  Patient presents for follow up with his wife for hx DVT.  He is ambulating with rollator and wearing a back brace.  Reports cracked spine; found a new Neurosurgeon at [facility] who will be completing spine surgery in the near future.  Appointment May 16, 2026.    He denies abnormal bleeding, bruising and SOB.  Compliant taking his Coumadin.  Patient was seen by Dr. Sharma; advised he still has IVF and still has clots in legs.  IVF to remain in since he will have back surgery.  Appointment with Dr. Sharma in June 2026.  Reviewed lab results.  Advised to stop Coumadin 3-5 days before surgery; start Lovenox injection once INR is 2.  Stop Lovenox night before procedure.  Post surgery advised to take Coumadin when on Lovenox injection; once INR is above 2 stop Lovenox and continue to follow up with Coumadin clinic.    HPI  This is a 64-year-old male he was transferring care.   He is currently on anticoagulation with Coumadin because of a history of multiple clots  He was heterozygous for factor 2 gene mutation.  He is paraplegic secondary to severe spinal stenosis she had a post laminectomy on 03/15/2024.  When acute subacute L1 fracture.  Looks like he was initially diagnosed with a PE in December of 2023.  And was discharged on Eliquis  He underwent placement of a temporary IVC filter by Dr. Baum on 03/10/2024.  Was placed on therapeutic Lovenox on 03/10.  And underwent bilateral decompressive laminectomies on 03/15/2024.  Then had to have a redo laminectomy on 03/17/2024.  He developed thrombosis post splenectomy but he was not on anticoagulation due to postop can see you for bleeding  There was a mention  of him failing Eliquis.  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  Past Surgical History:   Procedure Laterality Date    BACK SURGERY      2 prior  to 3/15/24 surgery    EVACUATION OF HEMATOMA  03/17/2024    following  back surgery    FIXATION KYPHOPLASTY N/A 08/23/2024    Procedure: Kyphoplasty;  Surgeon: Alejandra Smith MD;  Location: [facility];  Service: Neurosurgery;  Laterality: N/A;  L1   KYPHOPLASTY // C ARM // PRONE ZULEYMA // DEQUAN ARGUETA // NDM    [place] FILTER PLACEMENT  03/11/2024    LUMBAR LAMINECTOMY FOR DECOMPRESSION  03/15/2024    L2-L3 with fusion    SPINE SURGERY  03/2024      .No family history on file.   Social History     Socioeconomic History    Marital status:    Tobacco Use    Smoking status: Former     Current packs/day: 0.00     Types: Cigarettes    Smokeless tobacco: Former   Substance and Sexual Activity    Alcohol use: Not Currently    Drug use: Not  Currently     Types: Oxycodone     Comment: back pain    Sexual activity: Not Currently     Partners: Female     Birth control/protection: None     Social Drivers of Health     Food Insecurity: No Food Insecurity (4/8/2024)    [facility]    Hunger Vital Sign     Worried About Running Out of Food in the Last Year: Never true     Ran Out of Food in the Last Year: Never true   Transportation Needs: No Transportation Needs (4/8/2024)    Received from [facility]    PRAPARE - Transportation     Lack of Transportation (Medical): No     Lack of Transportation (Non-Medical): No      .  Review of patient's allergies indicates:   Allergen Reactions    Dilaudid [hydromorphone] Nausea And Vomiting        Current Outpatient Medications:     cyclobenzaprine (FLEXERIL) 10 MG tablet, Take 10 mg by mouth 2 (two) times daily., Disp: , Rfl:     finasteride (PROSCAR) 5 mg tablet, Take 5 mg by mouth once daily., Disp: , Rfl:     furosemide (LASIX) 40 MG tablet, Take 40 mg by mouth every morning., Disp: , Rfl:     gabapentin (NEURONTIN) 400 MG capsule, Take 400 mg by mouth 3 (three) times daily., Disp: , Rfl:     mupirocin (BACTROBAN) 2 % ointment, Apply topically 3 (three) times daily., Disp: 44 g, Rfl: 0    tamsulosin (FLOMAX) 0.4 mg Cap, Take 1 capsule by mouth., Disp: , Rfl:     warfarin (COUMADIN) 5 MG tablet, Take 5 mg by mouth every Mon, Wed, Fri., Disp: , Rfl:     warfarin (COUMADIN) 5 MG tablet, Take 0.5 tablets by mouth every Tuesday, Thursday, Saturday, Sunday., Disp: , Rfl:    Review of Systems   Constitutional:  Negative for appetite change and unexpected weight change.   HENT:  Negative for mouth sores.    Eyes:  Negative for visual disturbance.   Respiratory:  Negative for cough and shortness of breath.    Cardiovascular:  Negative for chest pain.    Gastrointestinal:  Negative for abdominal pain and diarrhea.   Genitourinary:  Negative for frequency.   Musculoskeletal:  Positive for back pain.   Skin:  Negative for rash.   Neurological:  Negative for headaches.   Hematological:  Negative for adenopathy.   Psychiatric/Behavioral:  The patient is not nervous/anxious.          See HPI  Objective:     Vitals:    04/25/25 0949   BP: 115/76   Pulse: 73   Resp: 18   Temp: 98.2 °F (36.8 °C)           Physical Exam  Vitals reviewed.   Constitutional:       Appearance: Normal appearance. He is not ill-appearing.   HENT:      Head: Normocephalic and atraumatic.      Nose: Nose normal.      Mouth/Throat:      Mouth: Mucous membranes are moist.   Cardiovascular:      Rate and Rhythm: Normal rate.   Pulmonary:      Effort: Pulmonary effort is normal.   Abdominal:      General: Abdomen is flat.      Palpations: Abdomen is soft.   Musculoskeletal:      Cervical back: Neck supple.      Right lower leg: Edema present.      Left lower leg: Edema present.      Comments: Patient needs moderate assistance with both upper extremities to stand.  He ambulates with a antalgic gait and bilateral AFOs and a rolling walker.  He was able to pick his legs up off the ground.  But has no sensation in his feet.   Skin:     General: Skin is dry.   Neurological:      Mental Status: He is alert and oriented to person, place, and time.      GCS: GCS eye subscore is 4. GCS verbal subscore is 5.      Cranial Nerves: No cranial nerve deficit.      Gait: Gait abnormal.   Psychiatric:         Attention and Perception: Attention normal.         Mood and Affect: Mood normal.         Speech: Speech normal.         Behavior: Behavior is cooperative.         ECOG SCORE         LAB  12/22/2023: Protein C activity 127  Protein S activity 117 normal  Antithrombin 3 normal  Cardiolipin IgA G and M normal  12/20/2023: Lupus anticoagulant reflux: New no lupus anticoagulant detected  01/05/2024: Factor 5 Leiden  not detected  01/05/2024: Heterozygous factor 2 mutation     Old imaging  12/03/2023: CTA:  Extensive pulmonary emboli with pulmonary artery dilated 3.8 cm with pulmonary hypertension  12/03/2023: Ultrasound duplex bilaterally: No DVT  12/18/2023: CT angiogram: Diffuse pulmonary emboli  03/12/2024: Ultrasound Doppler: No evidence of bilateral DVT  03/17/2024: CT angio chest:  There does appear to be a thrombus in the 2nd order lower lobe.  No evidence of right heart strain  03/17/2024: CT angio abdomen:  Recent vena cava filter (findings suggestive of occlusion of the inferior vena cava at the level of the filter, atherosclerotic calcification of the aorta and major branches  03/17/2024: Complete thrombosis of the venous system suspected thrombosed inferior vena cava  03/17/2024:  Interventional radiology, suction thrombectomy of the inferior vena cava below the filter  03/18/2024: Ultrasound bilateral upper extremities: No evidence of DVT  03/22/2024: Ultrasound bilateral lower extremities, negative for DVT  04/14/2024:  Venous lower extremity Doppler, partial occlusive thrombus right common femoral vein with occlusive thrombus right distal common femoral vein and popliteal vein  04/06/2024:  Thrombectomy inferior vena cava  06/05/2024: CT angio, small bilateral pulmonary emboli decreasing clot burden since March of 2024 06/05/2024: Ultrasound bilateral lower extremities,: Right peroneal vein unless superficial femoral vein DVT decreased from 04/04    Assessment/Plan:   On  anticoagulation   History of prothrombin mutation ---  History of IVC filter   H/o anemia --resolved    Recommendations   Continue lifelong anticoagulation   INR 2-3  Avoid multivitamins.    Avoid concomitant medications that cause bleeding,   Warfarin diet   Lovenox bridging for procedures         Warfarin should be held 3-5 days before surgery.           2-3 days after his warfarin is held he was on our she will be checked and he should be  started on Lovenox 1 milligram/kilogram subQ q.12 (his INR should be 2 or less)       He should continue on Lovenox up until the night before his surgery.         He should stop Lovenox the morning of his surgery      Once he was cleared from a surgical bleeding standpoint and Neurosurgery is happy, he was should be restarted on Lovenox          He should stay on Lovenox 1 milligram/kilogram subQ n55----soxiu his INR is back over to then it should be discontinued    consider removal of his IVC filter. Referred to Dr. Sharma  after surgery  Keep Warfarin Cliinic                Follow up in about 3 months (around 7/25/2025) for Labs cbc and protime INR and OV with Arthur.   Orders Placed This Encounter    CBC Auto Differential    Protime-INR     I, Era Pemberton LPN, acted solely as a scribe for and in the presence of, Dr. Alex Gibson who performed the service.     Alex Gibson,:MD

## 2025-04-25 ENCOUNTER — LAB VISIT (OUTPATIENT)
Dept: LAB | Facility: HOSPITAL | Age: 65
End: 2025-04-25
Attending: INTERNAL MEDICINE
Payer: MEDICARE

## 2025-04-25 ENCOUNTER — OFFICE VISIT (OUTPATIENT)
Dept: HEMATOLOGY/ONCOLOGY | Facility: CLINIC | Age: 65
End: 2025-04-25
Payer: MEDICARE

## 2025-04-25 VITALS
DIASTOLIC BLOOD PRESSURE: 76 MMHG | RESPIRATION RATE: 18 BRPM | OXYGEN SATURATION: 98 % | BODY MASS INDEX: 29.22 KG/M2 | TEMPERATURE: 98 F | WEIGHT: 208.69 LBS | HEIGHT: 71 IN | SYSTOLIC BLOOD PRESSURE: 115 MMHG | HEART RATE: 73 BPM

## 2025-04-25 DIAGNOSIS — D68.52 PROTHROMBIN GENE MUTATION: ICD-10-CM

## 2025-04-25 DIAGNOSIS — I82.4Y3 ACUTE DEEP VEIN THROMBOSIS (DVT) OF PROXIMAL VEIN OF BOTH LOWER EXTREMITIES: ICD-10-CM

## 2025-04-25 DIAGNOSIS — Z79.01 WARFARIN ANTICOAGULATION: ICD-10-CM

## 2025-04-25 DIAGNOSIS — I82.4Y3 ACUTE DEEP VEIN THROMBOSIS (DVT) OF PROXIMAL VEIN OF BOTH LOWER EXTREMITIES: Primary | ICD-10-CM

## 2025-04-25 LAB
BASOPHILS # BLD AUTO: 0.03 X10(3)/MCL
BASOPHILS NFR BLD AUTO: 0.5 %
EOSINOPHIL # BLD AUTO: 0.14 X10(3)/MCL (ref 0–0.9)
EOSINOPHIL NFR BLD AUTO: 2.4 %
ERYTHROCYTE [DISTWIDTH] IN BLOOD BY AUTOMATED COUNT: 13.3 % (ref 11.5–17)
HCT VFR BLD AUTO: 43.3 % (ref 42–52)
HGB BLD-MCNC: 14.2 G/DL (ref 14–18)
IMM GRANULOCYTES # BLD AUTO: 0.01 X10(3)/MCL (ref 0–0.04)
IMM GRANULOCYTES NFR BLD AUTO: 0.2 %
INR PPP: 2.4
LYMPHOCYTES # BLD AUTO: 1.71 X10(3)/MCL (ref 0.6–4.6)
LYMPHOCYTES NFR BLD AUTO: 28.8 %
MCH RBC QN AUTO: 30.1 PG (ref 27–31)
MCHC RBC AUTO-ENTMCNC: 32.8 G/DL (ref 33–36)
MCV RBC AUTO: 91.7 FL (ref 80–94)
MONOCYTES # BLD AUTO: 0.52 X10(3)/MCL (ref 0.1–1.3)
MONOCYTES NFR BLD AUTO: 8.8 %
NEUTROPHILS # BLD AUTO: 3.52 X10(3)/MCL (ref 2.1–9.2)
NEUTROPHILS NFR BLD AUTO: 59.3 %
PLATELET # BLD AUTO: 255 X10(3)/MCL (ref 130–400)
PMV BLD AUTO: 11.3 FL (ref 7.4–10.4)
PROTHROMBIN TIME: 26.1 SECONDS (ref 12.5–14.5)
RBC # BLD AUTO: 4.72 X10(6)/MCL (ref 4.7–6.1)
WBC # BLD AUTO: 5.93 X10(3)/MCL (ref 4.5–11.5)

## 2025-04-25 PROCEDURE — 85025 COMPLETE CBC W/AUTO DIFF WBC: CPT

## 2025-04-25 PROCEDURE — 85610 PROTHROMBIN TIME: CPT

## 2025-04-25 PROCEDURE — 99999 PR PBB SHADOW E&M-EST. PATIENT-LVL IV: CPT | Mod: PBBFAC,,, | Performed by: INTERNAL MEDICINE

## 2025-04-25 PROCEDURE — 99214 OFFICE O/P EST MOD 30 MIN: CPT | Mod: PBBFAC | Performed by: INTERNAL MEDICINE

## 2025-04-25 RX ORDER — CYCLOBENZAPRINE HCL 10 MG
10 TABLET ORAL 2 TIMES DAILY
COMMUNITY

## 2025-06-24 ENCOUNTER — PATIENT MESSAGE (OUTPATIENT)
Dept: HEMATOLOGY/ONCOLOGY | Facility: CLINIC | Age: 65
End: 2025-06-24
Payer: MEDICARE

## 2025-07-09 RX ORDER — ROSUVASTATIN CALCIUM 20 MG/1
20 TABLET, COATED ORAL DAILY
COMMUNITY

## 2025-07-11 ENCOUNTER — LAB VISIT (OUTPATIENT)
Dept: LAB | Facility: HOSPITAL | Age: 65
End: 2025-07-11
Attending: UROLOGY
Payer: MEDICARE

## 2025-07-11 DIAGNOSIS — N40.1 BPH WITH URINARY OBSTRUCTION: Primary | ICD-10-CM

## 2025-07-11 DIAGNOSIS — N13.8 BPH WITH URINARY OBSTRUCTION: ICD-10-CM

## 2025-07-11 DIAGNOSIS — Z01.810 PRE-OPERATIVE CARDIOVASCULAR EXAMINATION: ICD-10-CM

## 2025-07-11 DIAGNOSIS — N13.8 BPH WITH URINARY OBSTRUCTION: Primary | ICD-10-CM

## 2025-07-11 DIAGNOSIS — N40.1 BPH WITH URINARY OBSTRUCTION: ICD-10-CM

## 2025-07-11 LAB
ALBUMIN SERPL-MCNC: 3.9 G/DL (ref 3.4–4.8)
ALBUMIN/GLOB SERPL: 1.1 RATIO (ref 1.1–2)
ALP SERPL-CCNC: 92 UNIT/L (ref 40–150)
ALT SERPL-CCNC: 28 UNIT/L (ref 0–55)
ANION GAP SERPL CALC-SCNC: 9 MEQ/L
AST SERPL-CCNC: 22 UNIT/L (ref 11–45)
BACTERIA #/AREA URNS AUTO: ABNORMAL /HPF
BASOPHILS # BLD AUTO: 0.05 X10(3)/MCL
BASOPHILS NFR BLD AUTO: 1.2 %
BILIRUB SERPL-MCNC: 0.4 MG/DL
BILIRUB UR QL STRIP.AUTO: NEGATIVE
BUN SERPL-MCNC: 19.2 MG/DL (ref 8.4–25.7)
CALCIUM SERPL-MCNC: 9 MG/DL (ref 8.8–10)
CHLORIDE SERPL-SCNC: 102 MMOL/L (ref 98–107)
CLARITY UR: CLEAR
CO2 SERPL-SCNC: 29 MMOL/L (ref 23–31)
COLOR UR AUTO: COLORLESS
CREAT SERPL-MCNC: 0.82 MG/DL (ref 0.72–1.25)
CREAT/UREA NIT SERPL: 23
EOSINOPHIL # BLD AUTO: 0.18 X10(3)/MCL (ref 0–0.9)
EOSINOPHIL NFR BLD AUTO: 4.2 %
ERYTHROCYTE [DISTWIDTH] IN BLOOD BY AUTOMATED COUNT: 13.9 % (ref 11.5–17)
GFR SERPLBLD CREATININE-BSD FMLA CKD-EPI: >60 ML/MIN/1.73/M2
GLOBULIN SER-MCNC: 3.7 GM/DL (ref 2.4–3.5)
GLUCOSE SERPL-MCNC: 90 MG/DL (ref 82–115)
GLUCOSE UR QL STRIP: NORMAL
HCT VFR BLD AUTO: 45 % (ref 42–52)
HGB BLD-MCNC: 14.3 G/DL (ref 14–18)
HGB UR QL STRIP: NEGATIVE
HYALINE CASTS #/AREA URNS LPF: ABNORMAL /LPF
IMM GRANULOCYTES # BLD AUTO: 0.01 X10(3)/MCL (ref 0–0.04)
IMM GRANULOCYTES NFR BLD AUTO: 0.2 %
KETONES UR QL STRIP: NEGATIVE
LEUKOCYTE ESTERASE UR QL STRIP: NEGATIVE
LYMPHOCYTES # BLD AUTO: 1.59 X10(3)/MCL (ref 0.6–4.6)
LYMPHOCYTES NFR BLD AUTO: 37.4 %
MCH RBC QN AUTO: 29.9 PG (ref 27–31)
MCHC RBC AUTO-ENTMCNC: 31.8 G/DL (ref 33–36)
MCV RBC AUTO: 94.1 FL (ref 80–94)
MONOCYTES # BLD AUTO: 0.48 X10(3)/MCL (ref 0.1–1.3)
MONOCYTES NFR BLD AUTO: 11.3 %
MUCOUS THREADS URNS QL MICRO: ABNORMAL /LPF
NEUTROPHILS # BLD AUTO: 1.94 X10(3)/MCL (ref 2.1–9.2)
NEUTROPHILS NFR BLD AUTO: 45.7 %
NITRITE UR QL STRIP: NEGATIVE
NRBC BLD AUTO-RTO: 0 %
OHS QRS DURATION: 94 MS
OHS QTC CALCULATION: 464 MS
PH UR STRIP: 6 [PH]
PLATELET # BLD AUTO: 257 X10(3)/MCL (ref 130–400)
PMV BLD AUTO: 11.5 FL (ref 7.4–10.4)
POTASSIUM SERPL-SCNC: 4.2 MMOL/L (ref 3.5–5.1)
PROT SERPL-MCNC: 7.6 GM/DL (ref 5.8–7.6)
PROT UR QL STRIP: NEGATIVE
RBC # BLD AUTO: 4.78 X10(6)/MCL (ref 4.7–6.1)
RBC #/AREA URNS AUTO: ABNORMAL /HPF
SODIUM SERPL-SCNC: 140 MMOL/L (ref 136–145)
SP GR UR STRIP.AUTO: 1.01 (ref 1–1.03)
SQUAMOUS #/AREA URNS LPF: ABNORMAL /HPF
UROBILINOGEN UR STRIP-ACNC: NORMAL
WBC # BLD AUTO: 4.25 X10(3)/MCL (ref 4.5–11.5)
WBC #/AREA URNS AUTO: ABNORMAL /HPF

## 2025-07-11 PROCEDURE — 93010 ELECTROCARDIOGRAM REPORT: CPT | Mod: ,,, | Performed by: STUDENT IN AN ORGANIZED HEALTH CARE EDUCATION/TRAINING PROGRAM

## 2025-07-11 PROCEDURE — 85025 COMPLETE CBC W/AUTO DIFF WBC: CPT

## 2025-07-11 PROCEDURE — 80053 COMPREHEN METABOLIC PANEL: CPT

## 2025-07-11 PROCEDURE — 81001 URINALYSIS AUTO W/SCOPE: CPT

## 2025-07-11 PROCEDURE — 93005 ELECTROCARDIOGRAM TRACING: CPT

## 2025-07-11 PROCEDURE — 36415 COLL VENOUS BLD VENIPUNCTURE: CPT

## 2025-07-17 ENCOUNTER — ANESTHESIA EVENT (OUTPATIENT)
Dept: SURGERY | Facility: HOSPITAL | Age: 65
End: 2025-07-17
Payer: MEDICARE

## 2025-07-17 RX ORDER — LIDOCAINE HYDROCHLORIDE 10 MG/ML
1 INJECTION, SOLUTION EPIDURAL; INFILTRATION; INTRACAUDAL; PERINEURAL ONCE
Status: CANCELLED | OUTPATIENT
Start: 2025-07-17 | End: 2025-07-17

## 2025-07-17 RX ORDER — GLUCAGON 1 MG
1 KIT INJECTION
Status: CANCELLED | OUTPATIENT
Start: 2025-07-17

## 2025-07-18 ENCOUNTER — ANESTHESIA (OUTPATIENT)
Dept: SURGERY | Facility: HOSPITAL | Age: 65
End: 2025-07-18
Payer: MEDICARE

## 2025-07-18 ENCOUNTER — PATIENT MESSAGE (OUTPATIENT)
Dept: HEMATOLOGY/ONCOLOGY | Facility: CLINIC | Age: 65
End: 2025-07-18
Payer: MEDICARE

## 2025-07-18 ENCOUNTER — HOSPITAL ENCOUNTER (OUTPATIENT)
Facility: HOSPITAL | Age: 65
Discharge: HOME | End: 2025-07-18
Attending: UROLOGY | Admitting: UROLOGY
Payer: MEDICARE

## 2025-07-18 DIAGNOSIS — N13.8 ENLARGED PROSTATE WITH URINARY OBSTRUCTION: Primary | ICD-10-CM

## 2025-07-18 DIAGNOSIS — N40.1 ENLARGED PROSTATE WITH URINARY OBSTRUCTION: Primary | ICD-10-CM

## 2025-07-18 PROCEDURE — 25000003 PHARM REV CODE 250

## 2025-07-18 PROCEDURE — 36000706: Performed by: UROLOGY

## 2025-07-18 PROCEDURE — 63600175 PHARM REV CODE 636 W HCPCS: Performed by: UROLOGY

## 2025-07-18 PROCEDURE — 71000015 HC POSTOP RECOV 1ST HR: Performed by: UROLOGY

## 2025-07-18 PROCEDURE — 71000033 HC RECOVERY, INTIAL HOUR: Performed by: UROLOGY

## 2025-07-18 PROCEDURE — 37000008 HC ANESTHESIA 1ST 15 MINUTES: Performed by: UROLOGY

## 2025-07-18 PROCEDURE — 25000003 PHARM REV CODE 250: Performed by: ANESTHESIOLOGY

## 2025-07-18 PROCEDURE — 63600175 PHARM REV CODE 636 W HCPCS

## 2025-07-18 PROCEDURE — 71000016 HC POSTOP RECOV ADDL HR: Performed by: UROLOGY

## 2025-07-18 PROCEDURE — 37000009 HC ANESTHESIA EA ADD 15 MINS: Performed by: UROLOGY

## 2025-07-18 PROCEDURE — 63600175 PHARM REV CODE 636 W HCPCS: Performed by: ANESTHESIOLOGY

## 2025-07-18 PROCEDURE — 36000707: Performed by: UROLOGY

## 2025-07-18 PROCEDURE — L8699 PROSTHETIC IMPLANT NOS: HCPCS | Performed by: UROLOGY

## 2025-07-18 DEVICE — SYS UROLIFT ADV TISSUE CONTROL: Type: IMPLANTABLE DEVICE | Site: URETHRA | Status: FUNCTIONAL

## 2025-07-18 DEVICE — CARTRIDGE UROLIFT 2 IMPLANT: Type: IMPLANTABLE DEVICE | Site: URETHRA | Status: FUNCTIONAL

## 2025-07-18 RX ORDER — LIDOCAINE HYDROCHLORIDE 10 MG/ML
INJECTION, SOLUTION EPIDURAL; INFILTRATION; INTRACAUDAL; PERINEURAL
Status: DISCONTINUED | OUTPATIENT
Start: 2025-07-18 | End: 2025-07-18

## 2025-07-18 RX ORDER — ACETAMINOPHEN 500 MG
1000 TABLET ORAL
Status: COMPLETED | OUTPATIENT
Start: 2025-07-18 | End: 2025-07-18

## 2025-07-18 RX ORDER — GABAPENTIN 300 MG/1
300 CAPSULE ORAL
Status: COMPLETED | OUTPATIENT
Start: 2025-07-18 | End: 2025-07-18

## 2025-07-18 RX ORDER — EPHEDRINE SULFATE 50 MG/ML
INJECTION, SOLUTION INTRAVENOUS
Status: DISCONTINUED | OUTPATIENT
Start: 2025-07-18 | End: 2025-07-18

## 2025-07-18 RX ORDER — CELECOXIB 200 MG/1
200 CAPSULE ORAL
Status: COMPLETED | OUTPATIENT
Start: 2025-07-18 | End: 2025-07-18

## 2025-07-18 RX ORDER — OXYCODONE AND ACETAMINOPHEN 10; 325 MG/1; MG/1
1 TABLET ORAL EVERY 6 HOURS PRN
Qty: 12 TABLET | Refills: 0 | Status: SHIPPED | OUTPATIENT
Start: 2025-07-18

## 2025-07-18 RX ORDER — FENTANYL CITRATE 50 UG/ML
INJECTION, SOLUTION INTRAMUSCULAR; INTRAVENOUS
Status: DISCONTINUED | OUTPATIENT
Start: 2025-07-18 | End: 2025-07-18

## 2025-07-18 RX ORDER — PROPOFOL 10 MG/ML
VIAL (ML) INTRAVENOUS
Status: DISCONTINUED | OUTPATIENT
Start: 2025-07-18 | End: 2025-07-18

## 2025-07-18 RX ORDER — MIDAZOLAM HYDROCHLORIDE 2 MG/2ML
2 INJECTION, SOLUTION INTRAMUSCULAR; INTRAVENOUS ONCE AS NEEDED
Status: COMPLETED | OUTPATIENT
Start: 2025-07-18 | End: 2025-07-18

## 2025-07-18 RX ORDER — METHOCARBAMOL 100 MG/ML
1000 INJECTION, SOLUTION INTRAMUSCULAR; INTRAVENOUS ONCE AS NEEDED
Status: DISCONTINUED | OUTPATIENT
Start: 2025-07-18 | End: 2025-07-18 | Stop reason: HOSPADM

## 2025-07-18 RX ORDER — SODIUM CHLORIDE, SODIUM LACTATE, POTASSIUM CHLORIDE, CALCIUM CHLORIDE 600; 310; 30; 20 MG/100ML; MG/100ML; MG/100ML; MG/100ML
INJECTION, SOLUTION INTRAVENOUS CONTINUOUS
Status: DISCONTINUED | OUTPATIENT
Start: 2025-07-18 | End: 2025-07-18 | Stop reason: HOSPADM

## 2025-07-18 RX ORDER — ONDANSETRON 4 MG/1
4 TABLET, ORALLY DISINTEGRATING ORAL ONCE
Status: COMPLETED | OUTPATIENT
Start: 2025-07-18 | End: 2025-07-18

## 2025-07-18 RX ORDER — PROCHLORPERAZINE EDISYLATE 5 MG/ML
5 INJECTION INTRAMUSCULAR; INTRAVENOUS EVERY 30 MIN PRN
Status: DISCONTINUED | OUTPATIENT
Start: 2025-07-18 | End: 2025-07-18 | Stop reason: HOSPADM

## 2025-07-18 RX ORDER — HYDROMORPHONE HYDROCHLORIDE 2 MG/ML
0.4 INJECTION, SOLUTION INTRAMUSCULAR; INTRAVENOUS; SUBCUTANEOUS EVERY 5 MIN PRN
Status: DISCONTINUED | OUTPATIENT
Start: 2025-07-18 | End: 2025-07-18 | Stop reason: HOSPADM

## 2025-07-18 RX ORDER — DEXAMETHASONE SODIUM PHOSPHATE 4 MG/ML
INJECTION, SOLUTION INTRA-ARTICULAR; INTRALESIONAL; INTRAMUSCULAR; INTRAVENOUS; SOFT TISSUE
Status: DISCONTINUED | OUTPATIENT
Start: 2025-07-18 | End: 2025-07-18

## 2025-07-18 RX ORDER — CEFTRIAXONE 1 G/1
1 INJECTION, POWDER, FOR SOLUTION INTRAMUSCULAR; INTRAVENOUS
Status: DISCONTINUED | OUTPATIENT
Start: 2025-07-18 | End: 2025-07-18 | Stop reason: HOSPADM

## 2025-07-18 RX ADMIN — EPHEDRINE SULFATE 10 MG: 50 INJECTION INTRAVENOUS at 11:07

## 2025-07-18 RX ADMIN — CELECOXIB 200 MG: 200 CAPSULE ORAL at 10:07

## 2025-07-18 RX ADMIN — PROPOFOL 200 MG: 10 INJECTION, EMULSION INTRAVENOUS at 10:07

## 2025-07-18 RX ADMIN — FENTANYL CITRATE 100 MCG: 50 INJECTION, SOLUTION INTRAMUSCULAR; INTRAVENOUS at 10:07

## 2025-07-18 RX ADMIN — CEFTRIAXONE SODIUM 1 G: 1 INJECTION, POWDER, FOR SOLUTION INTRAMUSCULAR; INTRAVENOUS at 11:07

## 2025-07-18 RX ADMIN — MIDAZOLAM HYDROCHLORIDE 2 MG: 1 INJECTION, SOLUTION INTRAMUSCULAR; INTRAVENOUS at 10:07

## 2025-07-18 RX ADMIN — LIDOCAINE HYDROCHLORIDE 50 MG: 10 INJECTION, SOLUTION EPIDURAL; INFILTRATION; INTRACAUDAL; PERINEURAL at 10:07

## 2025-07-18 RX ADMIN — ACETAMINOPHEN 1000 MG: 500 TABLET ORAL at 10:07

## 2025-07-18 RX ADMIN — SODIUM CHLORIDE, POTASSIUM CHLORIDE, SODIUM LACTATE AND CALCIUM CHLORIDE: 600; 310; 30; 20 INJECTION, SOLUTION INTRAVENOUS at 10:07

## 2025-07-18 RX ADMIN — GABAPENTIN 300 MG: 300 CAPSULE ORAL at 10:07

## 2025-07-18 RX ADMIN — ONDANSETRON 4 MG: 4 TABLET, ORALLY DISINTEGRATING ORAL at 10:07

## 2025-07-18 RX ADMIN — DEXAMETHASONE SODIUM PHOSPHATE 4 MG: 4 INJECTION, SOLUTION INTRA-ARTICULAR; INTRALESIONAL; INTRAMUSCULAR; INTRAVENOUS; SOFT TISSUE at 10:07

## 2025-07-18 NOTE — ANESTHESIA PREPROCEDURE EVALUATION
07/17/2025  Bandar Foley is a 65 y.o., male, who presents for the following:    Procedure: CYSTOSCOPY, WITH INSERTION OF UROLIFT IMPLANT   Anesthesia type: Monitor Anesthesia Care   Diagnosis: Enlarged prostate with urinary obstruction [N40.1, N13.8]   Pre-op diagnosis: Enlarged prostate with urinary obstruction [N40.1, N13.8]   Location: [facility] OR 02 / [facility] OR   Surgeons: Dalton Islas MD     Past Medical History:   Diagnosis Date    Acute respiratory failure     Anticoagulant long-term use     CAD (coronary artery disease)     Cardiac arrest 03/2024    post op -back surgery    DVT (deep venous thrombosis)     following back surgery    Encounter for blood transfusion     Foot drop, bilateral     Hypercholesterolemia     Hypertrophy of prostate with urinary obstruction     Lumbar vertebral fracture     multiple surgeries    Multiple pulmonary emboli     12/18/2023    Muscle spasms of both lower extremities     Neuropathy     NSTEMI (non-ST elevated myocardial infarction)      LAB:      EKG:  Sinus rhythm with Premature atrial complexes   Nonspecific ST and T wave abnormality   Abnormal ECG   No previous ECGs available   Confirmed by Edgardo Gutierrez ([id number]) on 7/11/2025 1:52:34 PM     Pre-op Assessment    I have reviewed the Patient Summary Reports.     I have reviewed the Nursing Notes. I have reviewed the NPO Status.   I have reviewed the Medications.     Review of Systems  Anesthesia Hx:  No problems with previous Anesthesia             Denies Family Hx of Anesthesia complications.    Denies Personal Hx of Anesthesia complications.                    Social:  Former Smoker       Cardiovascular:                    CAD, s/p NSTEMI  DVT Hx->Multi PE's, anticoagulated w/ Coumadin                           Pulmonary:  Pulmonary Normal                       Renal/:    BPH              Endocrine:  Endocrine  Normal                Physical Exam  General: Alert and Oriented    Airway:  Mallampati: III   Mouth Opening: Normal  TM Distance: Normal  Tongue: Large  Neck ROM: Normal ROM    Dental:  Dentures    Chest/Lungs:  Normal Respiratory Rate    Heart:  Rate: Normal  Rhythm: Regular Rhythm        Anesthesia Plan  Type of Anesthesia, risks & benefits discussed:    Anesthesia Type: Gen Supraglottic Airway  Intra-op Monitoring Plan: Standard ASA Monitors  Post Op Pain Control Plan: multimodal analgesia and IV/PO Opioids PRN  Induction:  IV  Airway Plan: Direct, Post-Induction  Informed Consent: Informed consent signed with the Patient and all parties understand the risks and agree with anesthesia plan.  All questions answered.   ASA Score: 3  Day of Surgery Review of History & Physical: H&P Update referred to the surgeon/provider.  Anesthesia Plan Notes: Premedication: Midazolam  Special Technique: Preemptive analgesia with neurontin, zofran, acetaminophen and celebrex   LMA with OG tube  PONV prophylaxis    Ready For Surgery From Anesthesia Perspective.     .

## 2025-07-18 NOTE — TRANSFER OF CARE
"Anesthesia Transfer of Care Note    Patient: Bandar Foley    Procedure(s) Performed: Procedure(s) (LRB):  CYSTOSCOPY, WITH INSERTION OF UROLIFT IMPLANT (N/A)    Patient location: PACU    Anesthesia Type: general    Transport from OR: Transported from OR on room air with adequate spontaneous ventilation    Post pain: adequate analgesia    Post assessment: no apparent anesthetic complications    Post vital signs: stable    Level of consciousness: awake    Nausea/Vomiting: no nausea/vomiting    Complications: none    Transfer of care protocol was followed      Last vitals: Visit Vitals  BP (!) 147/82   Pulse 83   Temp 36.6 °C (97.9 °F) (Oral)   Resp 20   Ht 5' 11" (1.803 m)   Wt 101.7 kg (224 lb 3.3 oz)   SpO2 95%   BMI 31.27 kg/m²     "

## 2025-07-18 NOTE — ANESTHESIA POSTPROCEDURE EVALUATION
Anesthesia Post Evaluation    Patient: Bandar Foley    Procedure(s) Performed: Procedure(s) (LRB):  CYSTOSCOPY, WITH INSERTION OF UROLIFT IMPLANT (N/A)    Final Anesthesia Type: general      Patient location during evaluation: OPS  Patient participation: Yes- Able to Participate  Level of consciousness: awake and oriented  Post-procedure vital signs: reviewed and stable  Pain management: adequate  Airway patency: patent    PONV status at discharge: nausea (controlled) and vomiting (controlled)  Anesthetic complications: no      Cardiovascular status: hemodynamically stable  Respiratory status: unassisted and spontaneous ventilation  Hydration status: euvolemic  Follow-up not needed.              Vitals Value Taken Time   /77 07/18/25 13:20   Temp 37 °C (98.6 °F) 07/18/25 12:36   Pulse 73 07/18/25 13:20   Resp 17 07/18/25 12:36   SpO2 96 % 07/18/25 13:20         Event Time   Out of Recovery 12:30:00         Pain/Loni Score: Pain Rating Prior to Med Admin: 6 (7/18/2025 10:05 AM)  Loni Score: 10 (7/18/2025 12:32 PM)  Modified Loni Score: 20 (7/18/2025  1:26 PM)

## 2025-07-19 VITALS
BODY MASS INDEX: 31.39 KG/M2 | HEART RATE: 73 BPM | HEIGHT: 71 IN | WEIGHT: 224.19 LBS | OXYGEN SATURATION: 96 % | RESPIRATION RATE: 17 BRPM | DIASTOLIC BLOOD PRESSURE: 77 MMHG | SYSTOLIC BLOOD PRESSURE: 125 MMHG | TEMPERATURE: 99 F

## 2025-07-30 ENCOUNTER — HOSPITAL ENCOUNTER (INPATIENT)
Facility: HOSPITAL | Age: 65
LOS: 8 days | Discharge: HOME | DRG: 696 | End: 2025-08-07
Attending: STUDENT IN AN ORGANIZED HEALTH CARE EDUCATION/TRAINING PROGRAM | Admitting: INTERNAL MEDICINE
Payer: MEDICARE

## 2025-07-30 DIAGNOSIS — R31.0 GROSS HEMATURIA: ICD-10-CM

## 2025-07-30 DIAGNOSIS — R33.9 URINARY RETENTION: Primary | ICD-10-CM

## 2025-07-30 LAB
ALBUMIN SERPL-MCNC: 3.8 G/DL (ref 3.4–4.8)
ALBUMIN/GLOB SERPL: 1 RATIO (ref 1.1–2)
ALP SERPL-CCNC: 84 UNIT/L (ref 40–150)
ALT SERPL-CCNC: 36 UNIT/L (ref 0–55)
ANION GAP SERPL CALC-SCNC: 12 MEQ/L
AST SERPL-CCNC: 21 UNIT/L (ref 11–45)
BACTERIA #/AREA URNS AUTO: ABNORMAL /HPF
BASOPHILS # BLD AUTO: 0.08 X10(3)/MCL
BASOPHILS NFR BLD AUTO: 1 %
BILIRUB SERPL-MCNC: 0.3 MG/DL
BILIRUB UR QL STRIP.AUTO: ABNORMAL
BUN SERPL-MCNC: 27 MG/DL (ref 8.4–25.7)
CALCIUM SERPL-MCNC: 9.4 MG/DL (ref 8.8–10)
CHLORIDE SERPL-SCNC: 104 MMOL/L (ref 98–107)
CLARITY UR: ABNORMAL
CO2 SERPL-SCNC: 23 MMOL/L (ref 23–31)
COLOR UR AUTO: ABNORMAL
CREAT SERPL-MCNC: 0.97 MG/DL (ref 0.72–1.25)
CREAT/UREA NIT SERPL: 28
EOSINOPHIL # BLD AUTO: 0.09 X10(3)/MCL (ref 0–0.9)
EOSINOPHIL NFR BLD AUTO: 1.1 %
ERYTHROCYTE [DISTWIDTH] IN BLOOD BY AUTOMATED COUNT: 13.9 % (ref 11.5–17)
GFR SERPLBLD CREATININE-BSD FMLA CKD-EPI: >60 ML/MIN/1.73/M2
GLOBULIN SER-MCNC: 4 GM/DL (ref 2.4–3.5)
GLUCOSE SERPL-MCNC: 133 MG/DL (ref 82–115)
GLUCOSE UR QL STRIP: ABNORMAL
HCT VFR BLD AUTO: 34.6 % (ref 42–52)
HCT VFR BLD AUTO: 41.9 % (ref 42–52)
HGB BLD-MCNC: 11.2 G/DL (ref 14–18)
HGB BLD-MCNC: 13.3 G/DL (ref 14–18)
HGB UR QL STRIP: ABNORMAL
IMM GRANULOCYTES # BLD AUTO: 0.02 X10(3)/MCL (ref 0–0.04)
IMM GRANULOCYTES NFR BLD AUTO: 0.2 %
INR PPP: 1.6
KETONES UR QL STRIP: ABNORMAL
LEUKOCYTE ESTERASE UR QL STRIP: ABNORMAL
LYMPHOCYTES # BLD AUTO: 1.69 X10(3)/MCL (ref 0.6–4.6)
LYMPHOCYTES NFR BLD AUTO: 20.2 %
MCH RBC QN AUTO: 30 PG (ref 27–31)
MCHC RBC AUTO-ENTMCNC: 31.7 G/DL (ref 33–36)
MCV RBC AUTO: 94.6 FL (ref 80–94)
MONOCYTES # BLD AUTO: 0.77 X10(3)/MCL (ref 0.1–1.3)
MONOCYTES NFR BLD AUTO: 9.2 %
NEUTROPHILS # BLD AUTO: 5.73 X10(3)/MCL (ref 2.1–9.2)
NEUTROPHILS NFR BLD AUTO: 68.3 %
NITRITE UR QL STRIP: ABNORMAL
NRBC BLD AUTO-RTO: 0 %
PH UR STRIP: 7 [PH]
PLATELET # BLD AUTO: 281 X10(3)/MCL (ref 130–400)
PMV BLD AUTO: 11.4 FL (ref 7.4–10.4)
POTASSIUM SERPL-SCNC: 4.6 MMOL/L (ref 3.5–5.1)
PROT SERPL-MCNC: 7.8 GM/DL (ref 5.8–7.6)
PROT UR QL STRIP: ABNORMAL
PROTHROMBIN TIME: 19.3 SECONDS (ref 12.5–14.5)
RBC # BLD AUTO: 4.43 X10(6)/MCL (ref 4.7–6.1)
RBC #/AREA URNS AUTO: >100 /HPF
SODIUM SERPL-SCNC: 139 MMOL/L (ref 136–145)
SP GR UR STRIP.AUTO: 1.02 (ref 1–1.03)
SQUAMOUS #/AREA URNS LPF: ABNORMAL /HPF
UROBILINOGEN UR STRIP-ACNC: ABNORMAL
WBC # BLD AUTO: 8.38 X10(3)/MCL (ref 4.5–11.5)
WBC #/AREA URNS AUTO: ABNORMAL /HPF

## 2025-07-30 PROCEDURE — 25000003 PHARM REV CODE 250: Performed by: NURSE PRACTITIONER

## 2025-07-30 PROCEDURE — 99285 EMERGENCY DEPT VISIT HI MDM: CPT

## 2025-07-30 PROCEDURE — 63600175 PHARM REV CODE 636 W HCPCS: Performed by: STUDENT IN AN ORGANIZED HEALTH CARE EDUCATION/TRAINING PROGRAM

## 2025-07-30 PROCEDURE — 11000001 HC ACUTE MED/SURG PRIVATE ROOM

## 2025-07-30 PROCEDURE — 36415 COLL VENOUS BLD VENIPUNCTURE: CPT | Performed by: STUDENT IN AN ORGANIZED HEALTH CARE EDUCATION/TRAINING PROGRAM

## 2025-07-30 PROCEDURE — 81015 MICROSCOPIC EXAM OF URINE: CPT | Performed by: EMERGENCY MEDICINE

## 2025-07-30 PROCEDURE — 25000003 PHARM REV CODE 250: Performed by: STUDENT IN AN ORGANIZED HEALTH CARE EDUCATION/TRAINING PROGRAM

## 2025-07-30 PROCEDURE — 96374 THER/PROPH/DIAG INJ IV PUSH: CPT

## 2025-07-30 PROCEDURE — 21400001 HC TELEMETRY ROOM

## 2025-07-30 PROCEDURE — 25000003 PHARM REV CODE 250: Performed by: INTERNAL MEDICINE

## 2025-07-30 PROCEDURE — 85014 HEMATOCRIT: CPT | Performed by: STUDENT IN AN ORGANIZED HEALTH CARE EDUCATION/TRAINING PROGRAM

## 2025-07-30 PROCEDURE — 85025 COMPLETE CBC W/AUTO DIFF WBC: CPT | Performed by: STUDENT IN AN ORGANIZED HEALTH CARE EDUCATION/TRAINING PROGRAM

## 2025-07-30 PROCEDURE — 96375 TX/PRO/DX INJ NEW DRUG ADDON: CPT

## 2025-07-30 PROCEDURE — 80053 COMPREHEN METABOLIC PANEL: CPT | Performed by: STUDENT IN AN ORGANIZED HEALTH CARE EDUCATION/TRAINING PROGRAM

## 2025-07-30 PROCEDURE — 85610 PROTHROMBIN TIME: CPT | Performed by: STUDENT IN AN ORGANIZED HEALTH CARE EDUCATION/TRAINING PROGRAM

## 2025-07-30 RX ORDER — ENOXAPARIN SODIUM 150 MG/ML
100 INJECTION SUBCUTANEOUS 2 TIMES DAILY
COMMUNITY

## 2025-07-30 RX ORDER — GABAPENTIN 600 MG/1
600 TABLET ORAL 2 TIMES DAILY
COMMUNITY

## 2025-07-30 RX ORDER — SODIUM CHLORIDE 0.9 % (FLUSH) 0.9 %
10 SYRINGE (ML) INJECTION
Status: DISCONTINUED | OUTPATIENT
Start: 2025-07-30 | End: 2025-08-07 | Stop reason: HOSPADM

## 2025-07-30 RX ORDER — ONDANSETRON HYDROCHLORIDE 2 MG/ML
4 INJECTION, SOLUTION INTRAVENOUS
Status: COMPLETED | OUTPATIENT
Start: 2025-07-30 | End: 2025-07-30

## 2025-07-30 RX ORDER — MORPHINE SULFATE 4 MG/ML
4 INJECTION, SOLUTION INTRAMUSCULAR; INTRAVENOUS EVERY 4 HOURS PRN
Refills: 0 | Status: DISCONTINUED | OUTPATIENT
Start: 2025-07-30 | End: 2025-08-07 | Stop reason: HOSPADM

## 2025-07-30 RX ORDER — MUPIROCIN 20 MG/G
OINTMENT TOPICAL 2 TIMES DAILY
Status: CANCELLED | OUTPATIENT
Start: 2025-07-30 | End: 2025-08-04

## 2025-07-30 RX ORDER — MORPHINE SULFATE 4 MG/ML
4 INJECTION, SOLUTION INTRAMUSCULAR; INTRAVENOUS
Refills: 0 | Status: COMPLETED | OUTPATIENT
Start: 2025-07-30 | End: 2025-07-30

## 2025-07-30 RX ORDER — MUPIROCIN 20 MG/G
OINTMENT TOPICAL 2 TIMES DAILY
Status: COMPLETED | OUTPATIENT
Start: 2025-07-30 | End: 2025-08-04

## 2025-07-30 RX ORDER — FAMOTIDINE 20 MG/1
20 TABLET, FILM COATED ORAL 2 TIMES DAILY
Status: DISCONTINUED | OUTPATIENT
Start: 2025-07-30 | End: 2025-08-07 | Stop reason: HOSPADM

## 2025-07-30 RX ORDER — HYOSCYAMINE SULFATE 0.12 MG/1
0.25 TABLET SUBLINGUAL EVERY 4 HOURS PRN
Status: DISCONTINUED | OUTPATIENT
Start: 2025-07-30 | End: 2025-08-07 | Stop reason: HOSPADM

## 2025-07-30 RX ORDER — ACETAMINOPHEN 325 MG/1
650 TABLET ORAL EVERY 8 HOURS PRN
Status: DISCONTINUED | OUTPATIENT
Start: 2025-07-30 | End: 2025-08-07 | Stop reason: HOSPADM

## 2025-07-30 RX ORDER — MORPHINE SULFATE 4 MG/ML
2 INJECTION, SOLUTION INTRAMUSCULAR; INTRAVENOUS EVERY 4 HOURS PRN
Refills: 0 | Status: DISCONTINUED | OUTPATIENT
Start: 2025-07-30 | End: 2025-08-07 | Stop reason: HOSPADM

## 2025-07-30 RX ORDER — TALC
6 POWDER (GRAM) TOPICAL NIGHTLY PRN
Status: DISCONTINUED | OUTPATIENT
Start: 2025-07-30 | End: 2025-08-07 | Stop reason: HOSPADM

## 2025-07-30 RX ORDER — ONDANSETRON HYDROCHLORIDE 2 MG/ML
4 INJECTION, SOLUTION INTRAVENOUS EVERY 8 HOURS PRN
Status: DISCONTINUED | OUTPATIENT
Start: 2025-07-30 | End: 2025-08-07 | Stop reason: HOSPADM

## 2025-07-30 RX ADMIN — ONDANSETRON 4 MG: 2 INJECTION INTRAMUSCULAR; INTRAVENOUS at 12:07

## 2025-07-30 RX ADMIN — ONDANSETRON 4 MG: 2 INJECTION INTRAMUSCULAR; INTRAVENOUS at 01:07

## 2025-07-30 RX ADMIN — MORPHINE SULFATE 4 MG: 4 INJECTION, SOLUTION INTRAMUSCULAR; INTRAVENOUS at 07:07

## 2025-07-30 RX ADMIN — FAMOTIDINE 20 MG: 20 TABLET, FILM COATED ORAL at 08:07

## 2025-07-30 RX ADMIN — HYOSCYAMINE SULFATE 0.25 MG: 0.12 TABLET SUBLINGUAL at 10:07

## 2025-07-30 RX ADMIN — MUPIROCIN: 20 OINTMENT TOPICAL at 10:07

## 2025-07-30 RX ADMIN — MORPHINE SULFATE 2 MG: 4 INJECTION, SOLUTION INTRAMUSCULAR; INTRAVENOUS at 03:07

## 2025-07-30 RX ADMIN — MORPHINE SULFATE 4 MG: 4 INJECTION, SOLUTION INTRAMUSCULAR; INTRAVENOUS at 12:07

## 2025-07-30 RX ADMIN — MORPHINE SULFATE 4 MG: 4 INJECTION, SOLUTION INTRAMUSCULAR; INTRAVENOUS at 01:07

## 2025-07-31 PROBLEM — R31.0 GROSS HEMATURIA: Status: ACTIVE | Noted: 2025-07-31

## 2025-07-31 PROBLEM — R33.9 URINARY RETENTION: Status: ACTIVE | Noted: 2025-07-31

## 2025-07-31 LAB
ALBUMIN SERPL-MCNC: 3.1 G/DL (ref 3.4–4.8)
ALBUMIN/GLOB SERPL: 0.9 RATIO (ref 1.1–2)
ALP SERPL-CCNC: 65 UNIT/L (ref 40–150)
ALT SERPL-CCNC: 26 UNIT/L (ref 0–55)
ANION GAP SERPL CALC-SCNC: 7 MEQ/L
AST SERPL-CCNC: 19 UNIT/L (ref 11–45)
BASOPHILS # BLD AUTO: 0.05 X10(3)/MCL
BASOPHILS NFR BLD AUTO: 0.8 %
BILIRUB SERPL-MCNC: 0.3 MG/DL
BUN SERPL-MCNC: 23 MG/DL (ref 8.4–25.7)
CALCIUM SERPL-MCNC: 8.2 MG/DL (ref 8.8–10)
CHLORIDE SERPL-SCNC: 106 MMOL/L (ref 98–107)
CO2 SERPL-SCNC: 24 MMOL/L (ref 23–31)
CREAT SERPL-MCNC: 0.79 MG/DL (ref 0.72–1.25)
CREAT/UREA NIT SERPL: 29
EOSINOPHIL # BLD AUTO: 0.3 X10(3)/MCL (ref 0–0.9)
EOSINOPHIL NFR BLD AUTO: 4.8 %
ERYTHROCYTE [DISTWIDTH] IN BLOOD BY AUTOMATED COUNT: 14.2 % (ref 11.5–17)
GFR SERPLBLD CREATININE-BSD FMLA CKD-EPI: >60 ML/MIN/1.73/M2
GLOBULIN SER-MCNC: 3.6 GM/DL (ref 2.4–3.5)
GLUCOSE SERPL-MCNC: 101 MG/DL (ref 82–115)
HCT VFR BLD AUTO: 34.3 % (ref 42–52)
HGB BLD-MCNC: 11 G/DL (ref 14–18)
IMM GRANULOCYTES # BLD AUTO: 0.02 X10(3)/MCL (ref 0–0.04)
IMM GRANULOCYTES NFR BLD AUTO: 0.3 %
LYMPHOCYTES # BLD AUTO: 1.51 X10(3)/MCL (ref 0.6–4.6)
LYMPHOCYTES NFR BLD AUTO: 23.9 %
MCH RBC QN AUTO: 30.4 PG (ref 27–31)
MCHC RBC AUTO-ENTMCNC: 32.1 G/DL (ref 33–36)
MCV RBC AUTO: 94.8 FL (ref 80–94)
MONOCYTES # BLD AUTO: 0.96 X10(3)/MCL (ref 0.1–1.3)
MONOCYTES NFR BLD AUTO: 15.2 %
NEUTROPHILS # BLD AUTO: 3.47 X10(3)/MCL (ref 2.1–9.2)
NEUTROPHILS NFR BLD AUTO: 55 %
NRBC BLD AUTO-RTO: 0 %
PLATELET # BLD AUTO: 223 X10(3)/MCL (ref 130–400)
PMV BLD AUTO: 11.4 FL (ref 7.4–10.4)
POTASSIUM SERPL-SCNC: 4.7 MMOL/L (ref 3.5–5.1)
PROT SERPL-MCNC: 6.7 GM/DL (ref 5.8–7.6)
RBC # BLD AUTO: 3.62 X10(6)/MCL (ref 4.7–6.1)
SODIUM SERPL-SCNC: 137 MMOL/L (ref 136–145)
WBC # BLD AUTO: 6.31 X10(3)/MCL (ref 4.5–11.5)

## 2025-07-31 PROCEDURE — 85025 COMPLETE CBC W/AUTO DIFF WBC: CPT | Performed by: STUDENT IN AN ORGANIZED HEALTH CARE EDUCATION/TRAINING PROGRAM

## 2025-07-31 PROCEDURE — 80053 COMPREHEN METABOLIC PANEL: CPT | Performed by: STUDENT IN AN ORGANIZED HEALTH CARE EDUCATION/TRAINING PROGRAM

## 2025-07-31 PROCEDURE — 25000003 PHARM REV CODE 250: Performed by: NURSE PRACTITIONER

## 2025-07-31 PROCEDURE — 63600175 PHARM REV CODE 636 W HCPCS: Performed by: INTERNAL MEDICINE

## 2025-07-31 PROCEDURE — 25000003 PHARM REV CODE 250: Performed by: STUDENT IN AN ORGANIZED HEALTH CARE EDUCATION/TRAINING PROGRAM

## 2025-07-31 PROCEDURE — 36415 COLL VENOUS BLD VENIPUNCTURE: CPT | Performed by: STUDENT IN AN ORGANIZED HEALTH CARE EDUCATION/TRAINING PROGRAM

## 2025-07-31 PROCEDURE — 21400001 HC TELEMETRY ROOM

## 2025-07-31 PROCEDURE — 25000003 PHARM REV CODE 250: Performed by: INTERNAL MEDICINE

## 2025-07-31 PROCEDURE — 63600175 PHARM REV CODE 636 W HCPCS: Performed by: STUDENT IN AN ORGANIZED HEALTH CARE EDUCATION/TRAINING PROGRAM

## 2025-07-31 RX ORDER — CEFTRIAXONE 2 G/1
2 INJECTION, POWDER, FOR SOLUTION INTRAMUSCULAR; INTRAVENOUS
Status: DISCONTINUED | OUTPATIENT
Start: 2025-07-31 | End: 2025-08-01

## 2025-07-31 RX ORDER — ATORVASTATIN CALCIUM 40 MG/1
40 TABLET, FILM COATED ORAL DAILY
Status: DISCONTINUED | OUTPATIENT
Start: 2025-07-31 | End: 2025-08-07 | Stop reason: HOSPADM

## 2025-07-31 RX ORDER — FINASTERIDE 5 MG/1
5 TABLET, FILM COATED ORAL DAILY
Status: DISCONTINUED | OUTPATIENT
Start: 2025-07-31 | End: 2025-08-07 | Stop reason: HOSPADM

## 2025-07-31 RX ORDER — CYCLOBENZAPRINE HCL 10 MG
10 TABLET ORAL 2 TIMES DAILY
Status: DISCONTINUED | OUTPATIENT
Start: 2025-07-31 | End: 2025-08-07 | Stop reason: HOSPADM

## 2025-07-31 RX ORDER — FUROSEMIDE 40 MG/1
40 TABLET ORAL EVERY MORNING
Status: DISCONTINUED | OUTPATIENT
Start: 2025-07-31 | End: 2025-08-07 | Stop reason: HOSPADM

## 2025-07-31 RX ORDER — GABAPENTIN 300 MG/1
600 CAPSULE ORAL 2 TIMES DAILY
Status: DISCONTINUED | OUTPATIENT
Start: 2025-07-31 | End: 2025-08-07 | Stop reason: HOSPADM

## 2025-07-31 RX ORDER — OXYCODONE AND ACETAMINOPHEN 10; 325 MG/1; MG/1
1 TABLET ORAL EVERY 6 HOURS PRN
Refills: 0 | Status: DISCONTINUED | OUTPATIENT
Start: 2025-07-31 | End: 2025-08-07 | Stop reason: HOSPADM

## 2025-07-31 RX ORDER — TAMSULOSIN HYDROCHLORIDE 0.4 MG/1
1 CAPSULE ORAL DAILY
Status: DISCONTINUED | OUTPATIENT
Start: 2025-07-31 | End: 2025-08-07 | Stop reason: HOSPADM

## 2025-07-31 RX ADMIN — TAMSULOSIN HYDROCHLORIDE 0.4 MG: 0.4 CAPSULE ORAL at 09:07

## 2025-07-31 RX ADMIN — MORPHINE SULFATE 4 MG: 4 INJECTION, SOLUTION INTRAMUSCULAR; INTRAVENOUS at 10:07

## 2025-07-31 RX ADMIN — MUPIROCIN: 20 OINTMENT TOPICAL at 08:07

## 2025-07-31 RX ADMIN — HYOSCYAMINE SULFATE 0.25 MG: 0.12 TABLET SUBLINGUAL at 10:07

## 2025-07-31 RX ADMIN — FAMOTIDINE 20 MG: 20 TABLET, FILM COATED ORAL at 08:07

## 2025-07-31 RX ADMIN — FUROSEMIDE 40 MG: 40 TABLET ORAL at 09:07

## 2025-07-31 RX ADMIN — ATORVASTATIN CALCIUM 40 MG: 40 TABLET, FILM COATED ORAL at 09:07

## 2025-07-31 RX ADMIN — MORPHINE SULFATE 4 MG: 4 INJECTION, SOLUTION INTRAMUSCULAR; INTRAVENOUS at 08:07

## 2025-07-31 RX ADMIN — FAMOTIDINE 20 MG: 20 TABLET, FILM COATED ORAL at 10:07

## 2025-07-31 RX ADMIN — CYCLOBENZAPRINE 10 MG: 10 TABLET, FILM COATED ORAL at 09:07

## 2025-07-31 RX ADMIN — MORPHINE SULFATE 4 MG: 4 INJECTION, SOLUTION INTRAMUSCULAR; INTRAVENOUS at 12:07

## 2025-07-31 RX ADMIN — MORPHINE SULFATE 4 MG: 4 INJECTION, SOLUTION INTRAMUSCULAR; INTRAVENOUS at 04:07

## 2025-07-31 RX ADMIN — HYOSCYAMINE SULFATE 0.25 MG: 0.12 TABLET SUBLINGUAL at 02:07

## 2025-07-31 RX ADMIN — GABAPENTIN 600 MG: 300 CAPSULE ORAL at 09:07

## 2025-07-31 RX ADMIN — MUPIROCIN: 20 OINTMENT TOPICAL at 10:07

## 2025-07-31 RX ADMIN — GABAPENTIN 600 MG: 300 CAPSULE ORAL at 10:07

## 2025-07-31 RX ADMIN — HYOSCYAMINE SULFATE 0.25 MG: 0.12 TABLET SUBLINGUAL at 06:07

## 2025-07-31 RX ADMIN — CEFTRIAXONE SODIUM 2 G: 2 INJECTION, POWDER, FOR SOLUTION INTRAMUSCULAR; INTRAVENOUS at 09:07

## 2025-07-31 RX ADMIN — FINASTERIDE 5 MG: 5 TABLET, FILM COATED ORAL at 09:07

## 2025-07-31 RX ADMIN — CYCLOBENZAPRINE 10 MG: 10 TABLET, FILM COATED ORAL at 10:07

## 2025-07-31 RX ADMIN — MORPHINE SULFATE 4 MG: 4 INJECTION, SOLUTION INTRAMUSCULAR; INTRAVENOUS at 01:07

## 2025-08-01 LAB
ALBUMIN SERPL-MCNC: 3 G/DL (ref 3.4–4.8)
ALBUMIN/GLOB SERPL: 0.9 RATIO (ref 1.1–2)
ALP SERPL-CCNC: 67 UNIT/L (ref 40–150)
ALT SERPL-CCNC: 21 UNIT/L (ref 0–55)
ANION GAP SERPL CALC-SCNC: 4 MEQ/L
AST SERPL-CCNC: 13 UNIT/L (ref 11–45)
BASOPHILS # BLD AUTO: 0.05 X10(3)/MCL
BASOPHILS NFR BLD AUTO: 1 %
BILIRUB SERPL-MCNC: 0.3 MG/DL
BUN SERPL-MCNC: 22.4 MG/DL (ref 8.4–25.7)
CALCIUM SERPL-MCNC: 8.4 MG/DL (ref 8.8–10)
CHLORIDE SERPL-SCNC: 107 MMOL/L (ref 98–107)
CO2 SERPL-SCNC: 29 MMOL/L (ref 23–31)
CREAT SERPL-MCNC: 0.8 MG/DL (ref 0.72–1.25)
CREAT/UREA NIT SERPL: 28
EOSINOPHIL # BLD AUTO: 0.31 X10(3)/MCL (ref 0–0.9)
EOSINOPHIL NFR BLD AUTO: 6 %
ERYTHROCYTE [DISTWIDTH] IN BLOOD BY AUTOMATED COUNT: 14.1 % (ref 11.5–17)
GFR SERPLBLD CREATININE-BSD FMLA CKD-EPI: >60 ML/MIN/1.73/M2
GLOBULIN SER-MCNC: 3.4 GM/DL (ref 2.4–3.5)
GLUCOSE SERPL-MCNC: 108 MG/DL (ref 82–115)
HCT VFR BLD AUTO: 33.8 % (ref 42–52)
HGB BLD-MCNC: 10.7 G/DL (ref 14–18)
IMM GRANULOCYTES # BLD AUTO: 0.02 X10(3)/MCL (ref 0–0.04)
IMM GRANULOCYTES NFR BLD AUTO: 0.4 %
INR PPP: 1.4
LYMPHOCYTES # BLD AUTO: 1.48 X10(3)/MCL (ref 0.6–4.6)
LYMPHOCYTES NFR BLD AUTO: 28.6 %
MCH RBC QN AUTO: 29.8 PG (ref 27–31)
MCHC RBC AUTO-ENTMCNC: 31.7 G/DL (ref 33–36)
MCV RBC AUTO: 94.2 FL (ref 80–94)
MONOCYTES # BLD AUTO: 0.71 X10(3)/MCL (ref 0.1–1.3)
MONOCYTES NFR BLD AUTO: 13.7 %
NEUTROPHILS # BLD AUTO: 2.61 X10(3)/MCL (ref 2.1–9.2)
NEUTROPHILS NFR BLD AUTO: 50.3 %
NRBC BLD AUTO-RTO: 0 %
PLATELET # BLD AUTO: 201 X10(3)/MCL (ref 130–400)
PMV BLD AUTO: 11.2 FL (ref 7.4–10.4)
POTASSIUM SERPL-SCNC: 3.9 MMOL/L (ref 3.5–5.1)
PROT SERPL-MCNC: 6.4 GM/DL (ref 5.8–7.6)
PROTHROMBIN TIME: 17.5 SECONDS (ref 12.5–14.5)
RBC # BLD AUTO: 3.59 X10(6)/MCL (ref 4.7–6.1)
SODIUM SERPL-SCNC: 140 MMOL/L (ref 136–145)
WBC # BLD AUTO: 5.18 X10(3)/MCL (ref 4.5–11.5)

## 2025-08-01 PROCEDURE — 85025 COMPLETE CBC W/AUTO DIFF WBC: CPT | Performed by: INTERNAL MEDICINE

## 2025-08-01 PROCEDURE — 25000003 PHARM REV CODE 250: Performed by: NURSE PRACTITIONER

## 2025-08-01 PROCEDURE — 36415 COLL VENOUS BLD VENIPUNCTURE: CPT | Performed by: INTERNAL MEDICINE

## 2025-08-01 PROCEDURE — 85610 PROTHROMBIN TIME: CPT | Performed by: INTERNAL MEDICINE

## 2025-08-01 PROCEDURE — 80053 COMPREHEN METABOLIC PANEL: CPT | Performed by: INTERNAL MEDICINE

## 2025-08-01 PROCEDURE — 25000003 PHARM REV CODE 250: Performed by: INTERNAL MEDICINE

## 2025-08-01 PROCEDURE — 21400001 HC TELEMETRY ROOM

## 2025-08-01 PROCEDURE — 63600175 PHARM REV CODE 636 W HCPCS: Performed by: INTERNAL MEDICINE

## 2025-08-01 PROCEDURE — 63600175 PHARM REV CODE 636 W HCPCS: Performed by: STUDENT IN AN ORGANIZED HEALTH CARE EDUCATION/TRAINING PROGRAM

## 2025-08-01 RX ORDER — ENOXAPARIN SODIUM 100 MG/ML
100 INJECTION SUBCUTANEOUS 2 TIMES DAILY
Status: DISCONTINUED | OUTPATIENT
Start: 2025-08-01 | End: 2025-08-07 | Stop reason: HOSPADM

## 2025-08-01 RX ADMIN — CYCLOBENZAPRINE 10 MG: 10 TABLET, FILM COATED ORAL at 09:08

## 2025-08-01 RX ADMIN — HYOSCYAMINE SULFATE 0.25 MG: 0.12 TABLET SUBLINGUAL at 03:08

## 2025-08-01 RX ADMIN — GABAPENTIN 600 MG: 300 CAPSULE ORAL at 08:08

## 2025-08-01 RX ADMIN — MUPIROCIN: 20 OINTMENT TOPICAL at 08:08

## 2025-08-01 RX ADMIN — HYOSCYAMINE SULFATE 0.25 MG: 0.12 TABLET SUBLINGUAL at 10:08

## 2025-08-01 RX ADMIN — TAMSULOSIN HYDROCHLORIDE 0.4 MG: 0.4 CAPSULE ORAL at 08:08

## 2025-08-01 RX ADMIN — OXYCODONE AND ACETAMINOPHEN 1 TABLET: 325; 10 TABLET ORAL at 01:08

## 2025-08-01 RX ADMIN — CEFTRIAXONE SODIUM 2 G: 2 INJECTION, POWDER, FOR SOLUTION INTRAMUSCULAR; INTRAVENOUS at 10:08

## 2025-08-01 RX ADMIN — MUPIROCIN: 20 OINTMENT TOPICAL at 09:08

## 2025-08-01 RX ADMIN — HYOSCYAMINE SULFATE 0.25 MG: 0.12 TABLET SUBLINGUAL at 09:08

## 2025-08-01 RX ADMIN — FAMOTIDINE 20 MG: 20 TABLET, FILM COATED ORAL at 08:08

## 2025-08-01 RX ADMIN — OXYCODONE AND ACETAMINOPHEN 1 TABLET: 325; 10 TABLET ORAL at 07:08

## 2025-08-01 RX ADMIN — CYCLOBENZAPRINE 10 MG: 10 TABLET, FILM COATED ORAL at 08:08

## 2025-08-01 RX ADMIN — ENOXAPARIN SODIUM 100 MG: 100 INJECTION SUBCUTANEOUS at 09:08

## 2025-08-01 RX ADMIN — FAMOTIDINE 20 MG: 20 TABLET, FILM COATED ORAL at 09:08

## 2025-08-01 RX ADMIN — OXYCODONE AND ACETAMINOPHEN 1 TABLET: 325; 10 TABLET ORAL at 06:08

## 2025-08-01 RX ADMIN — ATORVASTATIN CALCIUM 40 MG: 40 TABLET, FILM COATED ORAL at 08:08

## 2025-08-01 RX ADMIN — HYOSCYAMINE SULFATE 0.25 MG: 0.12 TABLET SUBLINGUAL at 06:08

## 2025-08-01 RX ADMIN — FUROSEMIDE 40 MG: 40 TABLET ORAL at 06:08

## 2025-08-01 RX ADMIN — FINASTERIDE 5 MG: 5 TABLET, FILM COATED ORAL at 08:08

## 2025-08-01 RX ADMIN — GABAPENTIN 600 MG: 300 CAPSULE ORAL at 09:08

## 2025-08-02 ENCOUNTER — PATIENT MESSAGE (OUTPATIENT)
Dept: HEMATOLOGY/ONCOLOGY | Facility: CLINIC | Age: 65
End: 2025-08-02
Payer: MEDICARE

## 2025-08-02 LAB
ANION GAP SERPL CALC-SCNC: 8 MEQ/L
BASOPHILS # BLD AUTO: 0.06 X10(3)/MCL
BASOPHILS NFR BLD AUTO: 1.2 %
BUN SERPL-MCNC: 21.7 MG/DL (ref 8.4–25.7)
CALCIUM SERPL-MCNC: 8.6 MG/DL (ref 8.8–10)
CHLORIDE SERPL-SCNC: 102 MMOL/L (ref 98–107)
CO2 SERPL-SCNC: 26 MMOL/L (ref 23–31)
CREAT SERPL-MCNC: 0.77 MG/DL (ref 0.72–1.25)
CREAT/UREA NIT SERPL: 28
EOSINOPHIL # BLD AUTO: 0.28 X10(3)/MCL (ref 0–0.9)
EOSINOPHIL NFR BLD AUTO: 5.5 %
ERYTHROCYTE [DISTWIDTH] IN BLOOD BY AUTOMATED COUNT: 13.9 % (ref 11.5–17)
GFR SERPLBLD CREATININE-BSD FMLA CKD-EPI: >60 ML/MIN/1.73/M2
GLUCOSE SERPL-MCNC: 118 MG/DL (ref 82–115)
HCT VFR BLD AUTO: 34.9 % (ref 42–52)
HGB BLD-MCNC: 11.4 G/DL (ref 14–18)
IMM GRANULOCYTES # BLD AUTO: 0.01 X10(3)/MCL (ref 0–0.04)
IMM GRANULOCYTES NFR BLD AUTO: 0.2 %
LYMPHOCYTES # BLD AUTO: 1.83 X10(3)/MCL (ref 0.6–4.6)
LYMPHOCYTES NFR BLD AUTO: 35.7 %
MCH RBC QN AUTO: 30.2 PG (ref 27–31)
MCHC RBC AUTO-ENTMCNC: 32.7 G/DL (ref 33–36)
MCV RBC AUTO: 92.3 FL (ref 80–94)
MONOCYTES # BLD AUTO: 0.67 X10(3)/MCL (ref 0.1–1.3)
MONOCYTES NFR BLD AUTO: 13.1 %
NEUTROPHILS # BLD AUTO: 2.27 X10(3)/MCL (ref 2.1–9.2)
NEUTROPHILS NFR BLD AUTO: 44.3 %
NRBC BLD AUTO-RTO: 0 %
PLATELET # BLD AUTO: 226 X10(3)/MCL (ref 130–400)
PMV BLD AUTO: 11.2 FL (ref 7.4–10.4)
POTASSIUM SERPL-SCNC: 3.7 MMOL/L (ref 3.5–5.1)
RBC # BLD AUTO: 3.78 X10(6)/MCL (ref 4.7–6.1)
SODIUM SERPL-SCNC: 136 MMOL/L (ref 136–145)
WBC # BLD AUTO: 5.12 X10(3)/MCL (ref 4.5–11.5)

## 2025-08-02 PROCEDURE — 25000003 PHARM REV CODE 250: Performed by: NURSE PRACTITIONER

## 2025-08-02 PROCEDURE — 80048 BASIC METABOLIC PNL TOTAL CA: CPT | Performed by: INTERNAL MEDICINE

## 2025-08-02 PROCEDURE — 85025 COMPLETE CBC W/AUTO DIFF WBC: CPT | Performed by: INTERNAL MEDICINE

## 2025-08-02 PROCEDURE — 21400001 HC TELEMETRY ROOM

## 2025-08-02 PROCEDURE — 25000003 PHARM REV CODE 250: Performed by: INTERNAL MEDICINE

## 2025-08-02 PROCEDURE — 63600175 PHARM REV CODE 636 W HCPCS: Performed by: STUDENT IN AN ORGANIZED HEALTH CARE EDUCATION/TRAINING PROGRAM

## 2025-08-02 PROCEDURE — 36415 COLL VENOUS BLD VENIPUNCTURE: CPT | Performed by: INTERNAL MEDICINE

## 2025-08-02 RX ADMIN — FUROSEMIDE 40 MG: 40 TABLET ORAL at 08:08

## 2025-08-02 RX ADMIN — HYOSCYAMINE SULFATE 0.25 MG: 0.12 TABLET SUBLINGUAL at 06:08

## 2025-08-02 RX ADMIN — ENOXAPARIN SODIUM 100 MG: 100 INJECTION SUBCUTANEOUS at 08:08

## 2025-08-02 RX ADMIN — MUPIROCIN: 20 OINTMENT TOPICAL at 08:08

## 2025-08-02 RX ADMIN — OXYCODONE AND ACETAMINOPHEN 1 TABLET: 325; 10 TABLET ORAL at 02:08

## 2025-08-02 RX ADMIN — OXYCODONE AND ACETAMINOPHEN 1 TABLET: 325; 10 TABLET ORAL at 01:08

## 2025-08-02 RX ADMIN — TAMSULOSIN HYDROCHLORIDE 0.4 MG: 0.4 CAPSULE ORAL at 08:08

## 2025-08-02 RX ADMIN — HYOSCYAMINE SULFATE 0.25 MG: 0.12 TABLET SUBLINGUAL at 07:08

## 2025-08-02 RX ADMIN — CYCLOBENZAPRINE 10 MG: 10 TABLET, FILM COATED ORAL at 08:08

## 2025-08-02 RX ADMIN — FINASTERIDE 5 MG: 5 TABLET, FILM COATED ORAL at 08:08

## 2025-08-02 RX ADMIN — FAMOTIDINE 20 MG: 20 TABLET, FILM COATED ORAL at 08:08

## 2025-08-02 RX ADMIN — ENOXAPARIN SODIUM 100 MG: 100 INJECTION SUBCUTANEOUS at 11:08

## 2025-08-02 RX ADMIN — HYOSCYAMINE SULFATE 0.25 MG: 0.12 TABLET SUBLINGUAL at 01:08

## 2025-08-02 RX ADMIN — GABAPENTIN 600 MG: 300 CAPSULE ORAL at 08:08

## 2025-08-02 RX ADMIN — ATORVASTATIN CALCIUM 40 MG: 40 TABLET, FILM COATED ORAL at 08:08

## 2025-08-02 RX ADMIN — HYOSCYAMINE SULFATE 0.25 MG: 0.12 TABLET SUBLINGUAL at 11:08

## 2025-08-02 RX ADMIN — OXYCODONE AND ACETAMINOPHEN 1 TABLET: 325; 10 TABLET ORAL at 07:08

## 2025-08-03 LAB
ANION GAP SERPL CALC-SCNC: 5 MEQ/L
BASOPHILS # BLD AUTO: 0.05 X10(3)/MCL
BASOPHILS NFR BLD AUTO: 1 %
BUN SERPL-MCNC: 21 MG/DL (ref 8.4–25.7)
CALCIUM SERPL-MCNC: 8.7 MG/DL (ref 8.8–10)
CHLORIDE SERPL-SCNC: 102 MMOL/L (ref 98–107)
CO2 SERPL-SCNC: 31 MMOL/L (ref 23–31)
CREAT SERPL-MCNC: 0.81 MG/DL (ref 0.72–1.25)
CREAT/UREA NIT SERPL: 26
EOSINOPHIL # BLD AUTO: 0.22 X10(3)/MCL (ref 0–0.9)
EOSINOPHIL NFR BLD AUTO: 4.3 %
ERYTHROCYTE [DISTWIDTH] IN BLOOD BY AUTOMATED COUNT: 13.8 % (ref 11.5–17)
GFR SERPLBLD CREATININE-BSD FMLA CKD-EPI: >60 ML/MIN/1.73/M2
GLUCOSE SERPL-MCNC: 106 MG/DL (ref 82–115)
HCT VFR BLD AUTO: 35.2 % (ref 42–52)
HGB BLD-MCNC: 11.5 G/DL (ref 14–18)
IMM GRANULOCYTES # BLD AUTO: 0.02 X10(3)/MCL (ref 0–0.04)
IMM GRANULOCYTES NFR BLD AUTO: 0.4 %
LYMPHOCYTES # BLD AUTO: 1.84 X10(3)/MCL (ref 0.6–4.6)
LYMPHOCYTES NFR BLD AUTO: 36.2 %
MCH RBC QN AUTO: 30.3 PG (ref 27–31)
MCHC RBC AUTO-ENTMCNC: 32.7 G/DL (ref 33–36)
MCV RBC AUTO: 92.9 FL (ref 80–94)
MONOCYTES # BLD AUTO: 0.59 X10(3)/MCL (ref 0.1–1.3)
MONOCYTES NFR BLD AUTO: 11.6 %
NEUTROPHILS # BLD AUTO: 2.36 X10(3)/MCL (ref 2.1–9.2)
NEUTROPHILS NFR BLD AUTO: 46.5 %
NRBC BLD AUTO-RTO: 0 %
PLATELET # BLD AUTO: 259 X10(3)/MCL (ref 130–400)
PMV BLD AUTO: 11.1 FL (ref 7.4–10.4)
POTASSIUM SERPL-SCNC: 3.8 MMOL/L (ref 3.5–5.1)
RBC # BLD AUTO: 3.79 X10(6)/MCL (ref 4.7–6.1)
SODIUM SERPL-SCNC: 138 MMOL/L (ref 136–145)
WBC # BLD AUTO: 5.08 X10(3)/MCL (ref 4.5–11.5)

## 2025-08-03 PROCEDURE — 85025 COMPLETE CBC W/AUTO DIFF WBC: CPT | Performed by: INTERNAL MEDICINE

## 2025-08-03 PROCEDURE — 25000003 PHARM REV CODE 250: Performed by: INTERNAL MEDICINE

## 2025-08-03 PROCEDURE — 25500020 PHARM REV CODE 255: Performed by: STUDENT IN AN ORGANIZED HEALTH CARE EDUCATION/TRAINING PROGRAM

## 2025-08-03 PROCEDURE — 63600175 PHARM REV CODE 636 W HCPCS: Performed by: STUDENT IN AN ORGANIZED HEALTH CARE EDUCATION/TRAINING PROGRAM

## 2025-08-03 PROCEDURE — 21400001 HC TELEMETRY ROOM

## 2025-08-03 PROCEDURE — 36415 COLL VENOUS BLD VENIPUNCTURE: CPT | Performed by: INTERNAL MEDICINE

## 2025-08-03 PROCEDURE — 80048 BASIC METABOLIC PNL TOTAL CA: CPT | Performed by: INTERNAL MEDICINE

## 2025-08-03 PROCEDURE — 63600175 PHARM REV CODE 636 W HCPCS: Performed by: INTERNAL MEDICINE

## 2025-08-03 RX ORDER — WARFARIN SODIUM 5 MG/1
5 TABLET ORAL DAILY
Status: DISCONTINUED | OUTPATIENT
Start: 2025-08-03 | End: 2025-08-07 | Stop reason: HOSPADM

## 2025-08-03 RX ADMIN — OXYCODONE AND ACETAMINOPHEN 1 TABLET: 325; 10 TABLET ORAL at 06:08

## 2025-08-03 RX ADMIN — MORPHINE SULFATE 4 MG: 4 INJECTION, SOLUTION INTRAMUSCULAR; INTRAVENOUS at 02:08

## 2025-08-03 RX ADMIN — CYCLOBENZAPRINE 10 MG: 10 TABLET, FILM COATED ORAL at 09:08

## 2025-08-03 RX ADMIN — FAMOTIDINE 20 MG: 20 TABLET, FILM COATED ORAL at 09:08

## 2025-08-03 RX ADMIN — GABAPENTIN 600 MG: 300 CAPSULE ORAL at 09:08

## 2025-08-03 RX ADMIN — CYCLOBENZAPRINE 10 MG: 10 TABLET, FILM COATED ORAL at 08:08

## 2025-08-03 RX ADMIN — FINASTERIDE 5 MG: 5 TABLET, FILM COATED ORAL at 09:08

## 2025-08-03 RX ADMIN — GABAPENTIN 600 MG: 300 CAPSULE ORAL at 08:08

## 2025-08-03 RX ADMIN — FAMOTIDINE 20 MG: 20 TABLET, FILM COATED ORAL at 08:08

## 2025-08-03 RX ADMIN — TAMSULOSIN HYDROCHLORIDE 0.4 MG: 0.4 CAPSULE ORAL at 09:08

## 2025-08-03 RX ADMIN — IOHEXOL 100 ML: 350 INJECTION, SOLUTION INTRAVENOUS at 10:08

## 2025-08-03 RX ADMIN — MUPIROCIN: 20 OINTMENT TOPICAL at 09:08

## 2025-08-03 RX ADMIN — FUROSEMIDE 40 MG: 40 TABLET ORAL at 06:08

## 2025-08-03 RX ADMIN — MORPHINE SULFATE 4 MG: 4 INJECTION, SOLUTION INTRAMUSCULAR; INTRAVENOUS at 09:08

## 2025-08-03 RX ADMIN — ATORVASTATIN CALCIUM 40 MG: 40 TABLET, FILM COATED ORAL at 09:08

## 2025-08-03 RX ADMIN — WARFARIN SODIUM 5 MG: 5 TABLET ORAL at 04:08

## 2025-08-03 RX ADMIN — MUPIROCIN: 20 OINTMENT TOPICAL at 08:08

## 2025-08-03 RX ADMIN — ENOXAPARIN SODIUM 100 MG: 100 INJECTION SUBCUTANEOUS at 09:08

## 2025-08-03 RX ADMIN — ENOXAPARIN SODIUM 100 MG: 100 INJECTION SUBCUTANEOUS at 08:08

## 2025-08-04 LAB
ANION GAP SERPL CALC-SCNC: 7 MEQ/L
BASOPHILS # BLD AUTO: 0.03 X10(3)/MCL
BASOPHILS NFR BLD AUTO: 0.7 %
BUN SERPL-MCNC: 20.5 MG/DL (ref 8.4–25.7)
CALCIUM SERPL-MCNC: 8.5 MG/DL (ref 8.8–10)
CHLORIDE SERPL-SCNC: 104 MMOL/L (ref 98–107)
CO2 SERPL-SCNC: 29 MMOL/L (ref 23–31)
CREAT SERPL-MCNC: 0.76 MG/DL (ref 0.72–1.25)
CREAT/UREA NIT SERPL: 27
EOSINOPHIL # BLD AUTO: 0.26 X10(3)/MCL (ref 0–0.9)
EOSINOPHIL NFR BLD AUTO: 5.7 %
ERYTHROCYTE [DISTWIDTH] IN BLOOD BY AUTOMATED COUNT: 13.8 % (ref 11.5–17)
GFR SERPLBLD CREATININE-BSD FMLA CKD-EPI: >60 ML/MIN/1.73/M2
GLUCOSE SERPL-MCNC: 103 MG/DL (ref 82–115)
HCT VFR BLD AUTO: 34.8 % (ref 42–52)
HGB BLD-MCNC: 11.3 G/DL (ref 14–18)
IMM GRANULOCYTES # BLD AUTO: 0.01 X10(3)/MCL (ref 0–0.04)
IMM GRANULOCYTES NFR BLD AUTO: 0.2 %
INR PPP: 1.1
LYMPHOCYTES # BLD AUTO: 1.49 X10(3)/MCL (ref 0.6–4.6)
LYMPHOCYTES NFR BLD AUTO: 32.7 %
MCH RBC QN AUTO: 29.9 PG (ref 27–31)
MCHC RBC AUTO-ENTMCNC: 32.5 G/DL (ref 33–36)
MCV RBC AUTO: 92.1 FL (ref 80–94)
MONOCYTES # BLD AUTO: 0.61 X10(3)/MCL (ref 0.1–1.3)
MONOCYTES NFR BLD AUTO: 13.4 %
NEUTROPHILS # BLD AUTO: 2.15 X10(3)/MCL (ref 2.1–9.2)
NEUTROPHILS NFR BLD AUTO: 47.3 %
NRBC BLD AUTO-RTO: 0 %
PLATELET # BLD AUTO: 266 X10(3)/MCL (ref 130–400)
PMV BLD AUTO: 11 FL (ref 7.4–10.4)
POTASSIUM SERPL-SCNC: 3.7 MMOL/L (ref 3.5–5.1)
PROTHROMBIN TIME: 14.3 SECONDS (ref 12.5–14.5)
RBC # BLD AUTO: 3.78 X10(6)/MCL (ref 4.7–6.1)
SODIUM SERPL-SCNC: 140 MMOL/L (ref 136–145)
WBC # BLD AUTO: 4.55 X10(3)/MCL (ref 4.5–11.5)

## 2025-08-04 PROCEDURE — 80048 BASIC METABOLIC PNL TOTAL CA: CPT | Performed by: INTERNAL MEDICINE

## 2025-08-04 PROCEDURE — 25000003 PHARM REV CODE 250: Performed by: INTERNAL MEDICINE

## 2025-08-04 PROCEDURE — 36415 COLL VENOUS BLD VENIPUNCTURE: CPT | Performed by: INTERNAL MEDICINE

## 2025-08-04 PROCEDURE — 63600175 PHARM REV CODE 636 W HCPCS: Performed by: STUDENT IN AN ORGANIZED HEALTH CARE EDUCATION/TRAINING PROGRAM

## 2025-08-04 PROCEDURE — 21400001 HC TELEMETRY ROOM

## 2025-08-04 PROCEDURE — 85025 COMPLETE CBC W/AUTO DIFF WBC: CPT | Performed by: INTERNAL MEDICINE

## 2025-08-04 PROCEDURE — 25000003 PHARM REV CODE 250: Performed by: NURSE PRACTITIONER

## 2025-08-04 PROCEDURE — 85610 PROTHROMBIN TIME: CPT | Performed by: INTERNAL MEDICINE

## 2025-08-04 RX ORDER — PHENAZOPYRIDINE HYDROCHLORIDE 100 MG/1
100 TABLET, FILM COATED ORAL
Status: DISCONTINUED | OUTPATIENT
Start: 2025-08-04 | End: 2025-08-07 | Stop reason: HOSPADM

## 2025-08-04 RX ORDER — HYOSCYAMINE SULFATE 0.12 MG/1
0.12 TABLET SUBLINGUAL EVERY 4 HOURS PRN
Qty: 30 TABLET | Refills: 0 | Status: SHIPPED | OUTPATIENT
Start: 2025-08-04

## 2025-08-04 RX ORDER — PHENAZOPYRIDINE HYDROCHLORIDE 100 MG/1
100 TABLET, FILM COATED ORAL
Qty: 21 TABLET | Refills: 0 | Status: SHIPPED | OUTPATIENT
Start: 2025-08-04 | End: 2025-08-11

## 2025-08-04 RX ADMIN — ENOXAPARIN SODIUM 100 MG: 100 INJECTION SUBCUTANEOUS at 08:08

## 2025-08-04 RX ADMIN — CYCLOBENZAPRINE 10 MG: 10 TABLET, FILM COATED ORAL at 08:08

## 2025-08-04 RX ADMIN — OXYCODONE AND ACETAMINOPHEN 1 TABLET: 325; 10 TABLET ORAL at 01:08

## 2025-08-04 RX ADMIN — HYOSCYAMINE SULFATE 0.25 MG: 0.12 TABLET SUBLINGUAL at 01:08

## 2025-08-04 RX ADMIN — FAMOTIDINE 20 MG: 20 TABLET, FILM COATED ORAL at 08:08

## 2025-08-04 RX ADMIN — MUPIROCIN: 20 OINTMENT TOPICAL at 08:08

## 2025-08-04 RX ADMIN — GABAPENTIN 600 MG: 300 CAPSULE ORAL at 08:08

## 2025-08-04 RX ADMIN — FINASTERIDE 5 MG: 5 TABLET, FILM COATED ORAL at 08:08

## 2025-08-04 RX ADMIN — BACITRACIN ZINC, NEOMYCIN, POLYMYXIN B: 400; 3.5; 5 OINTMENT TOPICAL at 03:08

## 2025-08-04 RX ADMIN — ATORVASTATIN CALCIUM 40 MG: 40 TABLET, FILM COATED ORAL at 08:08

## 2025-08-04 RX ADMIN — BACITRACIN ZINC, NEOMYCIN, POLYMYXIN B: 400; 3.5; 5 OINTMENT TOPICAL at 08:08

## 2025-08-04 RX ADMIN — OXYCODONE AND ACETAMINOPHEN 1 TABLET: 325; 10 TABLET ORAL at 08:08

## 2025-08-04 RX ADMIN — FUROSEMIDE 40 MG: 40 TABLET ORAL at 06:08

## 2025-08-04 RX ADMIN — HYOSCYAMINE SULFATE 0.25 MG: 0.12 TABLET SUBLINGUAL at 11:08

## 2025-08-04 RX ADMIN — WARFARIN SODIUM 5 MG: 5 TABLET ORAL at 04:08

## 2025-08-04 RX ADMIN — TAMSULOSIN HYDROCHLORIDE 0.4 MG: 0.4 CAPSULE ORAL at 08:08

## 2025-08-04 RX ADMIN — PHENAZOPYRIDINE HYDROCHLORIDE 100 MG: 100 TABLET ORAL at 04:08

## 2025-08-05 LAB
ALBUMIN SERPL-MCNC: 3.2 G/DL (ref 3.4–4.8)
ALBUMIN/GLOB SERPL: 0.8 RATIO (ref 1.1–2)
ALP SERPL-CCNC: 83 UNIT/L (ref 40–150)
ALT SERPL-CCNC: 25 UNIT/L (ref 0–55)
ANION GAP SERPL CALC-SCNC: 6 MEQ/L
AST SERPL-CCNC: 20 UNIT/L (ref 11–45)
BASOPHILS # BLD AUTO: 0.05 X10(3)/MCL
BASOPHILS NFR BLD AUTO: 1.2 %
BILIRUB SERPL-MCNC: 0.4 MG/DL
BUN SERPL-MCNC: 16.9 MG/DL (ref 8.4–25.7)
CALCIUM SERPL-MCNC: 8.9 MG/DL (ref 8.8–10)
CHLORIDE SERPL-SCNC: 102 MMOL/L (ref 98–107)
CO2 SERPL-SCNC: 30 MMOL/L (ref 23–31)
CREAT SERPL-MCNC: 0.81 MG/DL (ref 0.72–1.25)
CREAT/UREA NIT SERPL: 21
EOSINOPHIL # BLD AUTO: 0.29 X10(3)/MCL (ref 0–0.9)
EOSINOPHIL NFR BLD AUTO: 6.7 %
ERYTHROCYTE [DISTWIDTH] IN BLOOD BY AUTOMATED COUNT: 13.6 % (ref 11.5–17)
GFR SERPLBLD CREATININE-BSD FMLA CKD-EPI: >60 ML/MIN/1.73/M2
GLOBULIN SER-MCNC: 3.8 GM/DL (ref 2.4–3.5)
GLUCOSE SERPL-MCNC: 99 MG/DL (ref 82–115)
HCT VFR BLD AUTO: 36 % (ref 42–52)
HGB BLD-MCNC: 11.7 G/DL (ref 14–18)
IMM GRANULOCYTES # BLD AUTO: 0.01 X10(3)/MCL (ref 0–0.04)
IMM GRANULOCYTES NFR BLD AUTO: 0.2 %
INR PPP: 1.2
LYMPHOCYTES # BLD AUTO: 1.62 X10(3)/MCL (ref 0.6–4.6)
LYMPHOCYTES NFR BLD AUTO: 37.4 %
MCH RBC QN AUTO: 30.2 PG (ref 27–31)
MCHC RBC AUTO-ENTMCNC: 32.5 G/DL (ref 33–36)
MCV RBC AUTO: 92.8 FL (ref 80–94)
MONOCYTES # BLD AUTO: 0.62 X10(3)/MCL (ref 0.1–1.3)
MONOCYTES NFR BLD AUTO: 14.3 %
NEUTROPHILS # BLD AUTO: 1.74 X10(3)/MCL (ref 2.1–9.2)
NEUTROPHILS NFR BLD AUTO: 40.2 %
NRBC BLD AUTO-RTO: 0 %
PLATELET # BLD AUTO: 269 X10(3)/MCL (ref 130–400)
PMV BLD AUTO: 11.1 FL (ref 7.4–10.4)
POTASSIUM SERPL-SCNC: 3.9 MMOL/L (ref 3.5–5.1)
PROT SERPL-MCNC: 7 GM/DL (ref 5.8–7.6)
PROTHROMBIN TIME: 15.1 SECONDS (ref 12.5–14.5)
RBC # BLD AUTO: 3.88 X10(6)/MCL (ref 4.7–6.1)
SODIUM SERPL-SCNC: 138 MMOL/L (ref 136–145)
WBC # BLD AUTO: 4.33 X10(3)/MCL (ref 4.5–11.5)

## 2025-08-05 PROCEDURE — 21400001 HC TELEMETRY ROOM

## 2025-08-05 PROCEDURE — 25000003 PHARM REV CODE 250: Performed by: INTERNAL MEDICINE

## 2025-08-05 PROCEDURE — 63600175 PHARM REV CODE 636 W HCPCS: Performed by: STUDENT IN AN ORGANIZED HEALTH CARE EDUCATION/TRAINING PROGRAM

## 2025-08-05 PROCEDURE — 36415 COLL VENOUS BLD VENIPUNCTURE: CPT | Performed by: INTERNAL MEDICINE

## 2025-08-05 PROCEDURE — 80053 COMPREHEN METABOLIC PANEL: CPT | Performed by: INTERNAL MEDICINE

## 2025-08-05 PROCEDURE — 85610 PROTHROMBIN TIME: CPT | Performed by: INTERNAL MEDICINE

## 2025-08-05 PROCEDURE — 25000003 PHARM REV CODE 250: Performed by: NURSE PRACTITIONER

## 2025-08-05 PROCEDURE — 85025 COMPLETE CBC W/AUTO DIFF WBC: CPT | Performed by: INTERNAL MEDICINE

## 2025-08-05 RX ADMIN — GABAPENTIN 600 MG: 300 CAPSULE ORAL at 08:08

## 2025-08-05 RX ADMIN — BACITRACIN ZINC, NEOMYCIN, POLYMYXIN B: 400; 3.5; 5 OINTMENT TOPICAL at 09:08

## 2025-08-05 RX ADMIN — ENOXAPARIN SODIUM 100 MG: 100 INJECTION SUBCUTANEOUS at 09:08

## 2025-08-05 RX ADMIN — OXYCODONE AND ACETAMINOPHEN 1 TABLET: 325; 10 TABLET ORAL at 02:08

## 2025-08-05 RX ADMIN — PHENAZOPYRIDINE HYDROCHLORIDE 100 MG: 100 TABLET ORAL at 11:08

## 2025-08-05 RX ADMIN — CYCLOBENZAPRINE 10 MG: 10 TABLET, FILM COATED ORAL at 09:08

## 2025-08-05 RX ADMIN — CYCLOBENZAPRINE 10 MG: 10 TABLET, FILM COATED ORAL at 08:08

## 2025-08-05 RX ADMIN — FINASTERIDE 5 MG: 5 TABLET, FILM COATED ORAL at 08:08

## 2025-08-05 RX ADMIN — OXYCODONE AND ACETAMINOPHEN 1 TABLET: 325; 10 TABLET ORAL at 09:08

## 2025-08-05 RX ADMIN — ATORVASTATIN CALCIUM 40 MG: 40 TABLET, FILM COATED ORAL at 08:08

## 2025-08-05 RX ADMIN — HYOSCYAMINE SULFATE 0.25 MG: 0.12 TABLET SUBLINGUAL at 02:08

## 2025-08-05 RX ADMIN — PHENAZOPYRIDINE HYDROCHLORIDE 100 MG: 100 TABLET ORAL at 04:08

## 2025-08-05 RX ADMIN — FUROSEMIDE 40 MG: 40 TABLET ORAL at 05:08

## 2025-08-05 RX ADMIN — ENOXAPARIN SODIUM 100 MG: 100 INJECTION SUBCUTANEOUS at 08:08

## 2025-08-05 RX ADMIN — HYOSCYAMINE SULFATE 0.25 MG: 0.12 TABLET SUBLINGUAL at 05:08

## 2025-08-05 RX ADMIN — PHENAZOPYRIDINE HYDROCHLORIDE 100 MG: 100 TABLET ORAL at 07:08

## 2025-08-05 RX ADMIN — BACITRACIN ZINC, NEOMYCIN, POLYMYXIN B: 400; 3.5; 5 OINTMENT TOPICAL at 08:08

## 2025-08-05 RX ADMIN — GABAPENTIN 600 MG: 300 CAPSULE ORAL at 09:08

## 2025-08-05 RX ADMIN — FAMOTIDINE 20 MG: 20 TABLET, FILM COATED ORAL at 09:08

## 2025-08-05 RX ADMIN — WARFARIN SODIUM 5 MG: 5 TABLET ORAL at 04:08

## 2025-08-05 RX ADMIN — BACITRACIN ZINC, NEOMYCIN, POLYMYXIN B: 400; 3.5; 5 OINTMENT TOPICAL at 02:08

## 2025-08-05 RX ADMIN — TAMSULOSIN HYDROCHLORIDE 0.4 MG: 0.4 CAPSULE ORAL at 08:08

## 2025-08-05 RX ADMIN — FAMOTIDINE 20 MG: 20 TABLET, FILM COATED ORAL at 08:08

## 2025-08-05 RX ADMIN — HYOSCYAMINE SULFATE 0.25 MG: 0.12 TABLET SUBLINGUAL at 09:08

## 2025-08-06 LAB
BASOPHILS # BLD AUTO: 0.04 X10(3)/MCL
BASOPHILS NFR BLD AUTO: 1.1 %
EOSINOPHIL # BLD AUTO: 0.31 X10(3)/MCL (ref 0–0.9)
EOSINOPHIL NFR BLD AUTO: 8.3 %
ERYTHROCYTE [DISTWIDTH] IN BLOOD BY AUTOMATED COUNT: 13.3 % (ref 11.5–17)
HCT VFR BLD AUTO: 34.6 % (ref 42–52)
HGB BLD-MCNC: 11.3 G/DL (ref 14–18)
IMM GRANULOCYTES # BLD AUTO: 0.01 X10(3)/MCL (ref 0–0.04)
IMM GRANULOCYTES NFR BLD AUTO: 0.3 %
INR PPP: 1.4
LYMPHOCYTES # BLD AUTO: 1.44 X10(3)/MCL (ref 0.6–4.6)
LYMPHOCYTES NFR BLD AUTO: 38.6 %
MCH RBC QN AUTO: 30.5 PG (ref 27–31)
MCHC RBC AUTO-ENTMCNC: 32.7 G/DL (ref 33–36)
MCV RBC AUTO: 93.3 FL (ref 80–94)
MONOCYTES # BLD AUTO: 0.43 X10(3)/MCL (ref 0.1–1.3)
MONOCYTES NFR BLD AUTO: 11.5 %
NEUTROPHILS # BLD AUTO: 1.5 X10(3)/MCL (ref 2.1–9.2)
NEUTROPHILS NFR BLD AUTO: 40.2 %
NRBC BLD AUTO-RTO: 0 %
PLATELET # BLD AUTO: 260 X10(3)/MCL (ref 130–400)
PMV BLD AUTO: 11.1 FL (ref 7.4–10.4)
PROTHROMBIN TIME: 16.9 SECONDS (ref 12.5–14.5)
RBC # BLD AUTO: 3.71 X10(6)/MCL (ref 4.7–6.1)
WBC # BLD AUTO: 3.73 X10(3)/MCL (ref 4.5–11.5)

## 2025-08-06 PROCEDURE — 85025 COMPLETE CBC W/AUTO DIFF WBC: CPT | Performed by: INTERNAL MEDICINE

## 2025-08-06 PROCEDURE — 25000003 PHARM REV CODE 250: Performed by: INTERNAL MEDICINE

## 2025-08-06 PROCEDURE — 21400001 HC TELEMETRY ROOM

## 2025-08-06 PROCEDURE — 63600175 PHARM REV CODE 636 W HCPCS: Mod: JZ,TB | Performed by: INTERNAL MEDICINE

## 2025-08-06 PROCEDURE — 36415 COLL VENOUS BLD VENIPUNCTURE: CPT | Performed by: INTERNAL MEDICINE

## 2025-08-06 PROCEDURE — 25000003 PHARM REV CODE 250: Performed by: NURSE PRACTITIONER

## 2025-08-06 PROCEDURE — 63600175 PHARM REV CODE 636 W HCPCS: Performed by: STUDENT IN AN ORGANIZED HEALTH CARE EDUCATION/TRAINING PROGRAM

## 2025-08-06 PROCEDURE — 85610 PROTHROMBIN TIME: CPT | Performed by: INTERNAL MEDICINE

## 2025-08-06 RX ADMIN — ATORVASTATIN CALCIUM 40 MG: 40 TABLET, FILM COATED ORAL at 09:08

## 2025-08-06 RX ADMIN — BACITRACIN ZINC, NEOMYCIN, POLYMYXIN B: 400; 3.5; 5 OINTMENT TOPICAL at 03:08

## 2025-08-06 RX ADMIN — PHENAZOPYRIDINE HYDROCHLORIDE 100 MG: 100 TABLET ORAL at 07:08

## 2025-08-06 RX ADMIN — WARFARIN SODIUM 5 MG: 5 TABLET ORAL at 07:08

## 2025-08-06 RX ADMIN — FAMOTIDINE 20 MG: 20 TABLET, FILM COATED ORAL at 09:08

## 2025-08-06 RX ADMIN — PHENAZOPYRIDINE HYDROCHLORIDE 100 MG: 100 TABLET ORAL at 12:08

## 2025-08-06 RX ADMIN — GABAPENTIN 600 MG: 300 CAPSULE ORAL at 10:08

## 2025-08-06 RX ADMIN — FINASTERIDE 5 MG: 5 TABLET, FILM COATED ORAL at 09:08

## 2025-08-06 RX ADMIN — HYOSCYAMINE SULFATE 0.25 MG: 0.12 TABLET SUBLINGUAL at 04:08

## 2025-08-06 RX ADMIN — GABAPENTIN 600 MG: 300 CAPSULE ORAL at 09:08

## 2025-08-06 RX ADMIN — TAMSULOSIN HYDROCHLORIDE 0.4 MG: 0.4 CAPSULE ORAL at 09:08

## 2025-08-06 RX ADMIN — FUROSEMIDE 40 MG: 40 TABLET ORAL at 07:08

## 2025-08-06 RX ADMIN — CYCLOBENZAPRINE 10 MG: 10 TABLET, FILM COATED ORAL at 09:08

## 2025-08-06 RX ADMIN — CYCLOBENZAPRINE 10 MG: 10 TABLET, FILM COATED ORAL at 10:08

## 2025-08-06 RX ADMIN — METHYLPREDNISOLONE SODIUM SUCCINATE 40 MG: 40 INJECTION, POWDER, FOR SOLUTION INTRAMUSCULAR; INTRAVENOUS at 07:08

## 2025-08-06 RX ADMIN — ENOXAPARIN SODIUM 100 MG: 100 INJECTION SUBCUTANEOUS at 09:08

## 2025-08-06 RX ADMIN — BACITRACIN ZINC, NEOMYCIN, POLYMYXIN B: 400; 3.5; 5 OINTMENT TOPICAL at 10:08

## 2025-08-06 RX ADMIN — BACITRACIN ZINC, NEOMYCIN, POLYMYXIN B: 400; 3.5; 5 OINTMENT TOPICAL at 09:08

## 2025-08-06 RX ADMIN — ENOXAPARIN SODIUM 100 MG: 100 INJECTION SUBCUTANEOUS at 10:08

## 2025-08-06 RX ADMIN — FAMOTIDINE 20 MG: 20 TABLET, FILM COATED ORAL at 10:08

## 2025-08-06 RX ADMIN — OXYCODONE AND ACETAMINOPHEN 1 TABLET: 325; 10 TABLET ORAL at 04:08

## 2025-08-07 VITALS
OXYGEN SATURATION: 100 % | RESPIRATION RATE: 18 BRPM | TEMPERATURE: 98 F | BODY MASS INDEX: 31.5 KG/M2 | HEIGHT: 71 IN | DIASTOLIC BLOOD PRESSURE: 84 MMHG | HEART RATE: 96 BPM | SYSTOLIC BLOOD PRESSURE: 142 MMHG | WEIGHT: 225 LBS

## 2025-08-07 PROBLEM — R33.9 URINARY RETENTION: Status: RESOLVED | Noted: 2025-07-31 | Resolved: 2025-08-07

## 2025-08-07 PROBLEM — R31.0 GROSS HEMATURIA: Status: RESOLVED | Noted: 2025-07-31 | Resolved: 2025-08-07

## 2025-08-07 LAB
BASOPHILS # BLD AUTO: 0.02 X10(3)/MCL
BASOPHILS NFR BLD AUTO: 0.4 %
EOSINOPHIL # BLD AUTO: 0 X10(3)/MCL (ref 0–0.9)
EOSINOPHIL NFR BLD AUTO: 0 %
ERYTHROCYTE [DISTWIDTH] IN BLOOD BY AUTOMATED COUNT: 13.1 % (ref 11.5–17)
HCT VFR BLD AUTO: 35.4 % (ref 42–52)
HGB BLD-MCNC: 11.9 G/DL (ref 14–18)
IMM GRANULOCYTES # BLD AUTO: 0.02 X10(3)/MCL (ref 0–0.04)
IMM GRANULOCYTES NFR BLD AUTO: 0.4 %
INR PPP: 1.5
LYMPHOCYTES # BLD AUTO: 0.97 X10(3)/MCL (ref 0.6–4.6)
LYMPHOCYTES NFR BLD AUTO: 19.6 %
MCH RBC QN AUTO: 30.5 PG (ref 27–31)
MCHC RBC AUTO-ENTMCNC: 33.6 G/DL (ref 33–36)
MCV RBC AUTO: 90.8 FL (ref 80–94)
MONOCYTES # BLD AUTO: 0.07 X10(3)/MCL (ref 0.1–1.3)
MONOCYTES NFR BLD AUTO: 1.4 %
NEUTROPHILS # BLD AUTO: 3.87 X10(3)/MCL (ref 2.1–9.2)
NEUTROPHILS NFR BLD AUTO: 78.2 %
NRBC BLD AUTO-RTO: 0 %
PLATELET # BLD AUTO: 290 X10(3)/MCL (ref 130–400)
PMV BLD AUTO: 11.2 FL (ref 7.4–10.4)
PROTHROMBIN TIME: 18.1 SECONDS (ref 12.5–14.5)
RBC # BLD AUTO: 3.9 X10(6)/MCL (ref 4.7–6.1)
WBC # BLD AUTO: 4.95 X10(3)/MCL (ref 4.5–11.5)

## 2025-08-07 PROCEDURE — 25000003 PHARM REV CODE 250: Performed by: NURSE PRACTITIONER

## 2025-08-07 PROCEDURE — 25000003 PHARM REV CODE 250: Performed by: INTERNAL MEDICINE

## 2025-08-07 PROCEDURE — 36415 COLL VENOUS BLD VENIPUNCTURE: CPT | Performed by: INTERNAL MEDICINE

## 2025-08-07 PROCEDURE — 63600175 PHARM REV CODE 636 W HCPCS: Performed by: STUDENT IN AN ORGANIZED HEALTH CARE EDUCATION/TRAINING PROGRAM

## 2025-08-07 PROCEDURE — 85025 COMPLETE CBC W/AUTO DIFF WBC: CPT | Performed by: INTERNAL MEDICINE

## 2025-08-07 PROCEDURE — 85610 PROTHROMBIN TIME: CPT | Performed by: INTERNAL MEDICINE

## 2025-08-07 RX ADMIN — PHENAZOPYRIDINE HYDROCHLORIDE 100 MG: 100 TABLET ORAL at 12:08

## 2025-08-07 RX ADMIN — WARFARIN SODIUM 5 MG: 5 TABLET ORAL at 04:08

## 2025-08-07 RX ADMIN — FUROSEMIDE 40 MG: 40 TABLET ORAL at 06:08

## 2025-08-07 RX ADMIN — FAMOTIDINE 20 MG: 20 TABLET, FILM COATED ORAL at 09:08

## 2025-08-07 RX ADMIN — FINASTERIDE 5 MG: 5 TABLET, FILM COATED ORAL at 09:08

## 2025-08-07 RX ADMIN — GABAPENTIN 600 MG: 300 CAPSULE ORAL at 09:08

## 2025-08-07 RX ADMIN — PHENAZOPYRIDINE HYDROCHLORIDE 100 MG: 100 TABLET ORAL at 06:08

## 2025-08-07 RX ADMIN — TAMSULOSIN HYDROCHLORIDE 0.4 MG: 0.4 CAPSULE ORAL at 09:08

## 2025-08-07 RX ADMIN — PHENAZOPYRIDINE HYDROCHLORIDE 100 MG: 100 TABLET ORAL at 04:08

## 2025-08-07 RX ADMIN — ENOXAPARIN SODIUM 100 MG: 100 INJECTION SUBCUTANEOUS at 09:08

## 2025-08-07 RX ADMIN — CYCLOBENZAPRINE 10 MG: 10 TABLET, FILM COATED ORAL at 09:08

## 2025-08-07 RX ADMIN — BACITRACIN ZINC, NEOMYCIN, POLYMYXIN B: 400; 3.5; 5 OINTMENT TOPICAL at 09:08

## 2025-08-07 RX ADMIN — ATORVASTATIN CALCIUM 40 MG: 40 TABLET, FILM COATED ORAL at 09:08

## 2025-08-08 ENCOUNTER — PATIENT OUTREACH (OUTPATIENT)
Dept: ADMINISTRATIVE | Facility: CLINIC | Age: 65
End: 2025-08-08
Payer: MEDICARE

## 2025-08-08 NOTE — PROGRESS NOTES
C3 nurse spoke with Bandar Foley for a TCC post hospital discharge follow up call. The patient has a scheduled [facility] appointment with Saige Maldonado MD (Internal Medicine) on 8/12/2025; @08:15.

## 2025-08-21 ENCOUNTER — OFFICE VISIT (OUTPATIENT)
Dept: HEMATOLOGY/ONCOLOGY | Facility: CLINIC | Age: 65
End: 2025-08-21
Payer: MEDICARE

## 2025-08-21 ENCOUNTER — LAB VISIT (OUTPATIENT)
Dept: LAB | Facility: HOSPITAL | Age: 65
End: 2025-08-21
Attending: INTERNAL MEDICINE
Payer: MEDICARE

## 2025-08-21 VITALS
BODY MASS INDEX: 31.76 KG/M2 | WEIGHT: 226.88 LBS | OXYGEN SATURATION: 96 % | HEART RATE: 92 BPM | TEMPERATURE: 98 F | SYSTOLIC BLOOD PRESSURE: 125 MMHG | DIASTOLIC BLOOD PRESSURE: 86 MMHG | RESPIRATION RATE: 18 BRPM | HEIGHT: 71 IN

## 2025-08-21 DIAGNOSIS — D68.52 PROTHROMBIN GENE MUTATION: ICD-10-CM

## 2025-08-21 DIAGNOSIS — D64.9 ANEMIA, UNSPECIFIED TYPE: ICD-10-CM

## 2025-08-21 DIAGNOSIS — I82.4Y3 ACUTE DEEP VEIN THROMBOSIS (DVT) OF PROXIMAL VEIN OF BOTH LOWER EXTREMITIES: Primary | ICD-10-CM

## 2025-08-21 DIAGNOSIS — I82.4Y3 ACUTE DEEP VEIN THROMBOSIS (DVT) OF PROXIMAL VEIN OF BOTH LOWER EXTREMITIES: ICD-10-CM

## 2025-08-21 LAB
BASOPHILS # BLD AUTO: 0.07 X10(3)/MCL
BASOPHILS NFR BLD AUTO: 1.2 %
EOSINOPHIL # BLD AUTO: 0.35 X10(3)/MCL (ref 0–0.9)
EOSINOPHIL NFR BLD AUTO: 6.2 %
ERYTHROCYTE [DISTWIDTH] IN BLOOD BY AUTOMATED COUNT: 14.1 % (ref 11.5–17)
HCT VFR BLD AUTO: 37.2 % (ref 42–52)
HGB BLD-MCNC: 12.3 G/DL (ref 14–18)
IMM GRANULOCYTES # BLD AUTO: 0.02 X10(3)/MCL (ref 0–0.04)
IMM GRANULOCYTES NFR BLD AUTO: 0.4 %
INR PPP: 1.2
LYMPHOCYTES # BLD AUTO: 1.74 X10(3)/MCL (ref 0.6–4.6)
LYMPHOCYTES NFR BLD AUTO: 31 %
MCH RBC QN AUTO: 30.1 PG (ref 27–31)
MCHC RBC AUTO-ENTMCNC: 33.1 G/DL (ref 33–36)
MCV RBC AUTO: 91 FL (ref 80–94)
MONOCYTES # BLD AUTO: 0.61 X10(3)/MCL (ref 0.1–1.3)
MONOCYTES NFR BLD AUTO: 10.9 %
NEUTROPHILS # BLD AUTO: 2.83 X10(3)/MCL (ref 2.1–9.2)
NEUTROPHILS NFR BLD AUTO: 50.3 %
PLATELET # BLD AUTO: 293 X10(3)/MCL (ref 130–400)
PMV BLD AUTO: 10.3 FL (ref 7.4–10.4)
PROTHROMBIN TIME: 15.6 SECONDS (ref 12.5–14.5)
RBC # BLD AUTO: 4.09 X10(6)/MCL (ref 4.7–6.1)
WBC # BLD AUTO: 5.62 X10(3)/MCL (ref 4.5–11.5)

## 2025-08-21 PROCEDURE — 85610 PROTHROMBIN TIME: CPT

## 2025-08-21 PROCEDURE — 99214 OFFICE O/P EST MOD 30 MIN: CPT | Mod: S$PBB,,, | Performed by: INTERNAL MEDICINE

## 2025-08-21 PROCEDURE — 99999 PR PBB SHADOW E&M-EST. PATIENT-LVL IV: CPT | Mod: PBBFAC,,, | Performed by: INTERNAL MEDICINE

## 2025-08-21 PROCEDURE — 85025 COMPLETE CBC W/AUTO DIFF WBC: CPT

## 2025-08-21 PROCEDURE — 99214 OFFICE O/P EST MOD 30 MIN: CPT | Mod: PBBFAC | Performed by: INTERNAL MEDICINE

## 2025-08-23 ENCOUNTER — PATIENT MESSAGE (OUTPATIENT)
Dept: RESEARCH | Facility: HOSPITAL | Age: 65
End: 2025-08-23
Payer: MEDICARE

## (undated) DEVICE — PAD DERMAPROX XL 22X14X.5IN

## (undated) DEVICE — GLOVE SIGNATURE MICRO LTX 7.5

## (undated) DEVICE — ADHESIVE MASTISOL VIAL 48/BX

## (undated) DEVICE — DRAPE ORTH SPLIT 77X108IN

## (undated) DEVICE — PILLOW HEAD REST

## (undated) DEVICE — GOWN X-LG STERILE BACK

## (undated) DEVICE — STRIP MEDI WND CLSR 1/2X4IN

## (undated) DEVICE — COVER TABLE HVY DTY 60X90IN

## (undated) DEVICE — GLOVE PROTEXIS BLUE LATEX 8

## (undated) DEVICE — COVER C-ARM STRAP BAND 44X80IN

## (undated) DEVICE — Device

## (undated) DEVICE — BAG DRAIN ANTI REFLUX 2000ML

## (undated) DEVICE — NDL SYR 10ML 18X1.5 LL BLUNT

## (undated) DEVICE — DRAPE UTILITY W/ TAPE 20X30IN

## (undated) DEVICE — HANDLE DEVON RIGID OR LIGHT

## (undated) DEVICE — NDL HYPO 22GX1 1/2 SYR 10ML LL

## (undated) DEVICE — SPONGE GAUZE 16PLY 4X4

## (undated) DEVICE — APPLICATOR CHLORAPREP ORN 26ML

## (undated) DEVICE — DRAPE FULL SHEET 70X100IN

## (undated) DEVICE — KIT POS JACKSON TABLE NO HDRST

## (undated) DEVICE — BLADE SURG STAINLESS STEEL #11

## (undated) DEVICE — DRAPE SURG W/TWL 17 5/8X23

## (undated) DEVICE — DRAPE INCISE IOBAN 2 23X23IN

## (undated) DEVICE — TOWEL OR DISP STRL BLUE 4/PK

## (undated) DEVICE — COVER PROXIMA MAYO STAND

## (undated) DEVICE — KIT SURGICAL TURNOVER

## (undated) DEVICE — SOL IRR WATER STRL 3000 ML

## (undated) DEVICE — GLOVE PROTEXIS LTX MICRO  7.5

## (undated) DEVICE — CUP PROFEX GLASS GRADUATE 1OZ

## (undated) DEVICE — DRAPE C-ARMOR EQUIPMENT COVER

## (undated) DEVICE — KIT UROLIFT 2 CARTRIDGE HANDLE
Type: IMPLANTABLE DEVICE | Site: URETHRA | Status: NON-FUNCTIONAL
Removed: 2025-07-18

## (undated) DEVICE — DRAPE TOP 53X102IN

## (undated) DEVICE — BOWL STERILE LARGE 32OZ

## (undated) DEVICE — SPONGE COTTON TRAY 4X4IN

## (undated) DEVICE — SUPPORT ULNA NERVE PROTECTOR

## (undated) DEVICE — STRAP POS KNEE BODY 4X60IN

## (undated) DEVICE — TUBING SILICON CLR 3/16IN 10FT